# Patient Record
Sex: MALE | Race: WHITE | Employment: FULL TIME | URBAN - METROPOLITAN AREA
[De-identification: names, ages, dates, MRNs, and addresses within clinical notes are randomized per-mention and may not be internally consistent; named-entity substitution may affect disease eponyms.]

---

## 2017-07-19 ENCOUNTER — OFFICE VISIT (OUTPATIENT)
Dept: FAMILY MEDICINE CLINIC | Age: 22
End: 2017-07-19

## 2017-07-19 VITALS
SYSTOLIC BLOOD PRESSURE: 118 MMHG | HEIGHT: 67 IN | OXYGEN SATURATION: 97 % | BODY MASS INDEX: 25.58 KG/M2 | WEIGHT: 163 LBS | DIASTOLIC BLOOD PRESSURE: 70 MMHG | HEART RATE: 128 BPM

## 2017-07-19 DIAGNOSIS — N62 GYNECOMASTIA: ICD-10-CM

## 2017-07-19 DIAGNOSIS — Z00.00 WELL ADULT EXAM: Primary | ICD-10-CM

## 2017-07-19 PROCEDURE — 99385 PREV VISIT NEW AGE 18-39: CPT | Performed by: NURSE PRACTITIONER

## 2017-07-20 ENCOUNTER — INITIAL CONSULT (OUTPATIENT)
Dept: SURGERY | Age: 22
End: 2017-07-20

## 2017-07-20 ENCOUNTER — SURG/PROC ORDERS (OUTPATIENT)
Dept: SURGERY | Age: 22
End: 2017-07-20

## 2017-07-20 VITALS
SYSTOLIC BLOOD PRESSURE: 122 MMHG | BODY MASS INDEX: 25.58 KG/M2 | WEIGHT: 163 LBS | DIASTOLIC BLOOD PRESSURE: 84 MMHG | HEIGHT: 67 IN

## 2017-07-20 DIAGNOSIS — N62 GYNECOMASTIA, MALE: Primary | ICD-10-CM

## 2017-07-20 PROCEDURE — 99243 OFF/OP CNSLTJ NEW/EST LOW 30: CPT | Performed by: SURGERY

## 2017-07-20 RX ORDER — HEPARIN SODIUM 5000 [USP'U]/ML
5000 INJECTION, SOLUTION INTRAVENOUS; SUBCUTANEOUS ONCE
Status: CANCELLED | OUTPATIENT
Start: 2017-07-20

## 2017-07-20 RX ORDER — SODIUM CHLORIDE 0.9 % (FLUSH) 0.9 %
10 SYRINGE (ML) INJECTION PRN
Status: CANCELLED | OUTPATIENT
Start: 2017-07-20

## 2017-07-20 RX ORDER — SODIUM CHLORIDE 0.9 % (FLUSH) 0.9 %
10 SYRINGE (ML) INJECTION EVERY 12 HOURS SCHEDULED
Status: CANCELLED | OUTPATIENT
Start: 2017-07-20

## 2017-07-24 ENCOUNTER — TELEPHONE (OUTPATIENT)
Dept: SURGERY | Age: 22
End: 2017-07-24

## 2017-08-08 ENCOUNTER — HOSPITAL ENCOUNTER (OUTPATIENT)
Dept: SURGERY | Age: 22
Discharge: HOME | End: 2017-08-08
Attending: SURGERY | Admitting: SURGERY

## 2017-08-08 VITALS
RESPIRATION RATE: 16 BRPM | HEART RATE: 79 BPM | OXYGEN SATURATION: 100 % | TEMPERATURE: 98.2 F | BODY MASS INDEX: 25.58 KG/M2 | WEIGHT: 163 LBS | SYSTOLIC BLOOD PRESSURE: 142 MMHG | DIASTOLIC BLOOD PRESSURE: 78 MMHG | HEIGHT: 67 IN

## 2017-08-08 PROCEDURE — 19300 MASTECTOMY FOR GYNECOMASTIA: CPT | Performed by: SURGERY

## 2017-08-08 RX ORDER — HEPARIN SODIUM 5000 [USP'U]/ML
5000 INJECTION, SOLUTION INTRAVENOUS; SUBCUTANEOUS ONCE
Status: COMPLETED | OUTPATIENT
Start: 2017-08-08 | End: 2017-08-08

## 2017-08-08 RX ORDER — MEPERIDINE HYDROCHLORIDE 25 MG/ML
12.5 INJECTION INTRAMUSCULAR; INTRAVENOUS; SUBCUTANEOUS EVERY 5 MIN PRN
Status: DISCONTINUED | OUTPATIENT
Start: 2017-08-08 | End: 2017-08-09 | Stop reason: HOSPADM

## 2017-08-08 RX ORDER — OXYCODONE HYDROCHLORIDE AND ACETAMINOPHEN 5; 325 MG/1; MG/1
1 TABLET ORAL
Status: COMPLETED | OUTPATIENT
Start: 2017-08-08 | End: 2017-08-08

## 2017-08-08 RX ORDER — SODIUM CHLORIDE, SODIUM LACTATE, POTASSIUM CHLORIDE, CALCIUM CHLORIDE 600; 310; 30; 20 MG/100ML; MG/100ML; MG/100ML; MG/100ML
INJECTION, SOLUTION INTRAVENOUS CONTINUOUS
Status: DISCONTINUED | OUTPATIENT
Start: 2017-08-08 | End: 2017-08-09 | Stop reason: HOSPADM

## 2017-08-08 RX ORDER — ONDANSETRON 2 MG/ML
4 INJECTION INTRAMUSCULAR; INTRAVENOUS
Status: COMPLETED | OUTPATIENT
Start: 2017-08-08 | End: 2017-08-08

## 2017-08-08 RX ORDER — LABETALOL HYDROCHLORIDE 5 MG/ML
5 INJECTION, SOLUTION INTRAVENOUS EVERY 10 MIN PRN
Status: DISCONTINUED | OUTPATIENT
Start: 2017-08-08 | End: 2017-08-09 | Stop reason: HOSPADM

## 2017-08-08 RX ORDER — OXYCODONE HYDROCHLORIDE 5 MG/1
5-10 TABLET ORAL EVERY 4 HOURS PRN
Qty: 30 TABLET | Refills: 0 | Status: SHIPPED | OUTPATIENT
Start: 2017-08-08 | End: 2017-08-24 | Stop reason: ALTCHOICE

## 2017-08-08 RX ORDER — SODIUM CHLORIDE 0.9 % (FLUSH) 0.9 %
10 SYRINGE (ML) INJECTION EVERY 12 HOURS SCHEDULED
Status: DISCONTINUED | OUTPATIENT
Start: 2017-08-08 | End: 2017-08-09 | Stop reason: HOSPADM

## 2017-08-08 RX ORDER — HYDROMORPHONE HCL 110MG/55ML
0.5 PATIENT CONTROLLED ANALGESIA SYRINGE INTRAVENOUS EVERY 5 MIN PRN
Status: COMPLETED | OUTPATIENT
Start: 2017-08-08 | End: 2017-08-08

## 2017-08-08 RX ORDER — HYDRALAZINE HYDROCHLORIDE 20 MG/ML
5 INJECTION INTRAMUSCULAR; INTRAVENOUS EVERY 10 MIN PRN
Status: DISCONTINUED | OUTPATIENT
Start: 2017-08-08 | End: 2017-08-09 | Stop reason: HOSPADM

## 2017-08-08 RX ORDER — SODIUM CHLORIDE 0.9 % (FLUSH) 0.9 %
10 SYRINGE (ML) INJECTION PRN
Status: DISCONTINUED | OUTPATIENT
Start: 2017-08-08 | End: 2017-08-09 | Stop reason: HOSPADM

## 2017-08-08 RX ADMIN — Medication 0.5 MG: at 12:57

## 2017-08-08 RX ADMIN — ONDANSETRON 4 MG: 2 INJECTION INTRAMUSCULAR; INTRAVENOUS at 12:52

## 2017-08-08 RX ADMIN — OXYCODONE HYDROCHLORIDE AND ACETAMINOPHEN 1 TABLET: 5; 325 TABLET ORAL at 13:04

## 2017-08-08 RX ADMIN — SODIUM CHLORIDE, SODIUM LACTATE, POTASSIUM CHLORIDE, CALCIUM CHLORIDE: 600; 310; 30; 20 INJECTION, SOLUTION INTRAVENOUS at 10:22

## 2017-08-08 RX ADMIN — Medication 0.5 MG: at 12:52

## 2017-08-08 RX ADMIN — Medication 0.5 MG: at 13:02

## 2017-08-08 RX ADMIN — HEPARIN SODIUM 5000 UNITS: 5000 INJECTION, SOLUTION INTRAVENOUS; SUBCUTANEOUS at 10:23

## 2017-08-08 RX ADMIN — Medication 0.5 MG: at 13:08

## 2017-08-08 ASSESSMENT — PAIN SCALES - GENERAL
PAINLEVEL_OUTOF10: 5
PAINLEVEL_OUTOF10: 4
PAINLEVEL_OUTOF10: 6
PAINLEVEL_OUTOF10: 5
PAINLEVEL_OUTOF10: 4
PAINLEVEL_OUTOF10: 5
PAINLEVEL_OUTOF10: 4

## 2017-08-08 ASSESSMENT — PAIN - FUNCTIONAL ASSESSMENT: PAIN_FUNCTIONAL_ASSESSMENT: 0-10

## 2017-08-24 ENCOUNTER — OFFICE VISIT (OUTPATIENT)
Dept: SURGERY | Age: 22
End: 2017-08-24

## 2017-08-24 VITALS
BODY MASS INDEX: 25.11 KG/M2 | WEIGHT: 160 LBS | HEIGHT: 67 IN | DIASTOLIC BLOOD PRESSURE: 70 MMHG | SYSTOLIC BLOOD PRESSURE: 122 MMHG

## 2017-08-24 DIAGNOSIS — Z98.890 POST-OPERATIVE STATE: Primary | ICD-10-CM

## 2017-08-24 PROCEDURE — 99024 POSTOP FOLLOW-UP VISIT: CPT | Performed by: SURGERY

## 2022-01-31 ENCOUNTER — APPOINTMENT (OUTPATIENT)
Dept: CT IMAGING | Age: 27
End: 2022-01-31
Payer: COMMERCIAL

## 2022-01-31 ENCOUNTER — APPOINTMENT (OUTPATIENT)
Dept: GENERAL RADIOLOGY | Age: 27
End: 2022-01-31
Payer: COMMERCIAL

## 2022-01-31 ENCOUNTER — HOSPITAL ENCOUNTER (EMERGENCY)
Age: 27
Discharge: HOME | End: 2022-01-31
Payer: COMMERCIAL

## 2022-01-31 VITALS
HEIGHT: 67 IN | SYSTOLIC BLOOD PRESSURE: 146 MMHG | WEIGHT: 215 LBS | RESPIRATION RATE: 16 BRPM | HEART RATE: 96 BPM | OXYGEN SATURATION: 99 % | DIASTOLIC BLOOD PRESSURE: 96 MMHG | TEMPERATURE: 97.3 F | BODY MASS INDEX: 33.74 KG/M2

## 2022-01-31 DIAGNOSIS — E87.6 HYPOKALEMIA: ICD-10-CM

## 2022-01-31 DIAGNOSIS — R11.2 NAUSEA AND VOMITING, INTRACTABILITY OF VOMITING NOT SPECIFIED, UNSPECIFIED VOMITING TYPE: ICD-10-CM

## 2022-01-31 DIAGNOSIS — B34.9 VIRAL ILLNESS: ICD-10-CM

## 2022-01-31 DIAGNOSIS — R53.83 OTHER FATIGUE: ICD-10-CM

## 2022-01-31 DIAGNOSIS — R07.9 CHEST PAIN, UNSPECIFIED TYPE: Primary | ICD-10-CM

## 2022-01-31 DIAGNOSIS — E83.42 HYPOMAGNESEMIA: ICD-10-CM

## 2022-01-31 LAB
A/G RATIO: 1.7 (ref 1.1–2.2)
ALBUMIN SERPL-MCNC: 5.2 G/DL (ref 3.4–5)
ALP BLD-CCNC: 85 U/L (ref 40–129)
ALT SERPL-CCNC: 71 U/L (ref 10–40)
ANION GAP SERPL CALCULATED.3IONS-SCNC: 15 MMOL/L (ref 3–16)
AST SERPL-CCNC: 98 U/L (ref 15–37)
BASOPHILS ABSOLUTE: 0.1 K/UL (ref 0–0.2)
BASOPHILS RELATIVE PERCENT: 1 %
BILIRUB SERPL-MCNC: 3.2 MG/DL (ref 0–1)
BUN BLDV-MCNC: 8 MG/DL (ref 7–20)
CALCIUM SERPL-MCNC: 9.5 MG/DL (ref 8.3–10.6)
CHLORIDE BLD-SCNC: 100 MMOL/L (ref 99–110)
CO2: 25 MMOL/L (ref 21–32)
CREAT SERPL-MCNC: 0.9 MG/DL (ref 0.9–1.3)
D DIMER: 261 NG/ML DDU (ref 0–229)
EKG ATRIAL RATE: 142 BPM
EKG DIAGNOSIS: NORMAL
EKG P AXIS: 70 DEGREES
EKG P-R INTERVAL: 134 MS
EKG Q-T INTERVAL: 270 MS
EKG QRS DURATION: 78 MS
EKG QTC CALCULATION (BAZETT): 415 MS
EKG R AXIS: 73 DEGREES
EKG T AXIS: 35 DEGREES
EKG VENTRICULAR RATE: 142 BPM
EOSINOPHILS ABSOLUTE: 0 K/UL (ref 0–0.6)
EOSINOPHILS RELATIVE PERCENT: 0.1 %
GFR AFRICAN AMERICAN: >60
GFR NON-AFRICAN AMERICAN: >60
GLUCOSE BLD-MCNC: 136 MG/DL (ref 70–99)
HCT VFR BLD CALC: 46.8 % (ref 40.5–52.5)
HEMOGLOBIN: 16.5 G/DL (ref 13.5–17.5)
INFLUENZA A: NOT DETECTED
INFLUENZA B: NOT DETECTED
LYMPHOCYTES ABSOLUTE: 1.3 K/UL (ref 1–5.1)
LYMPHOCYTES RELATIVE PERCENT: 19.3 %
MAGNESIUM: 1.4 MG/DL (ref 1.8–2.4)
MCH RBC QN AUTO: 32.6 PG (ref 26–34)
MCHC RBC AUTO-ENTMCNC: 35.3 G/DL (ref 31–36)
MCV RBC AUTO: 92.3 FL (ref 80–100)
MONOCYTES ABSOLUTE: 0.5 K/UL (ref 0–1.3)
MONOCYTES RELATIVE PERCENT: 7.5 %
NEUTROPHILS ABSOLUTE: 5 K/UL (ref 1.7–7.7)
NEUTROPHILS RELATIVE PERCENT: 72.1 %
PDW BLD-RTO: 13.3 % (ref 12.4–15.4)
PLATELET # BLD: 143 K/UL (ref 135–450)
PMV BLD AUTO: 6.3 FL (ref 5–10.5)
POTASSIUM REFLEX MAGNESIUM: 3.4 MMOL/L (ref 3.5–5.1)
RBC # BLD: 5.07 M/UL (ref 4.2–5.9)
SARS-COV-2 RNA, RT PCR: NOT DETECTED
SODIUM BLD-SCNC: 140 MMOL/L (ref 136–145)
TOTAL PROTEIN: 8.3 G/DL (ref 6.4–8.2)
TROPONIN: <0.01 NG/ML
TROPONIN: <0.01 NG/ML
WBC # BLD: 6.9 K/UL (ref 4–11)

## 2022-01-31 PROCEDURE — 83735 ASSAY OF MAGNESIUM: CPT

## 2022-01-31 PROCEDURE — 96366 THER/PROPH/DIAG IV INF ADDON: CPT

## 2022-01-31 PROCEDURE — 93005 ELECTROCARDIOGRAM TRACING: CPT | Performed by: STUDENT IN AN ORGANIZED HEALTH CARE EDUCATION/TRAINING PROGRAM

## 2022-01-31 PROCEDURE — 71260 CT THORAX DX C+: CPT

## 2022-01-31 PROCEDURE — 6360000004 HC RX CONTRAST MEDICATION: Performed by: PHYSICIAN ASSISTANT

## 2022-01-31 PROCEDURE — 71045 X-RAY EXAM CHEST 1 VIEW: CPT

## 2022-01-31 PROCEDURE — 6360000002 HC RX W HCPCS: Performed by: PHYSICIAN ASSISTANT

## 2022-01-31 PROCEDURE — U0003 INFECTIOUS AGENT DETECTION BY NUCLEIC ACID (DNA OR RNA); SEVERE ACUTE RESPIRATORY SYNDROME CORONAVIRUS 2 (SARS-COV-2) (CORONAVIRUS DISEASE [COVID-19]), AMPLIFIED PROBE TECHNIQUE, MAKING USE OF HIGH THROUGHPUT TECHNOLOGIES AS DESCRIBED BY CMS-2020-01-R: HCPCS

## 2022-01-31 PROCEDURE — 80053 COMPREHEN METABOLIC PANEL: CPT

## 2022-01-31 PROCEDURE — 93010 ELECTROCARDIOGRAM REPORT: CPT | Performed by: INTERNAL MEDICINE

## 2022-01-31 PROCEDURE — 96375 TX/PRO/DX INJ NEW DRUG ADDON: CPT

## 2022-01-31 PROCEDURE — 6370000000 HC RX 637 (ALT 250 FOR IP): Performed by: PHYSICIAN ASSISTANT

## 2022-01-31 PROCEDURE — 2500000003 HC RX 250 WO HCPCS: Performed by: PHYSICIAN ASSISTANT

## 2022-01-31 PROCEDURE — 99284 EMERGENCY DEPT VISIT MOD MDM: CPT

## 2022-01-31 PROCEDURE — U0005 INFEC AGEN DETEC AMPLI PROBE: HCPCS

## 2022-01-31 PROCEDURE — 96365 THER/PROPH/DIAG IV INF INIT: CPT

## 2022-01-31 PROCEDURE — 87636 SARSCOV2 & INF A&B AMP PRB: CPT

## 2022-01-31 PROCEDURE — 84484 ASSAY OF TROPONIN QUANT: CPT

## 2022-01-31 PROCEDURE — 85379 FIBRIN DEGRADATION QUANT: CPT

## 2022-01-31 PROCEDURE — 85025 COMPLETE CBC W/AUTO DIFF WBC: CPT

## 2022-01-31 RX ORDER — 0.9 % SODIUM CHLORIDE 0.9 %
1000 INTRAVENOUS SOLUTION INTRAVENOUS ONCE
Status: DISCONTINUED | OUTPATIENT
Start: 2022-01-31 | End: 2022-01-31 | Stop reason: HOSPADM

## 2022-01-31 RX ORDER — MAGNESIUM SULFATE IN WATER 40 MG/ML
4000 INJECTION, SOLUTION INTRAVENOUS ONCE
Status: COMPLETED | OUTPATIENT
Start: 2022-01-31 | End: 2022-01-31

## 2022-01-31 RX ORDER — MULTIVIT-MIN/IRON FUM/FOLIC AC 7.5 MG-4
1 TABLET ORAL DAILY
Qty: 30 TABLET | Refills: 3 | Status: SHIPPED | OUTPATIENT
Start: 2022-01-31 | End: 2022-06-24

## 2022-01-31 RX ORDER — KETOROLAC TROMETHAMINE 30 MG/ML
15 INJECTION, SOLUTION INTRAMUSCULAR; INTRAVENOUS ONCE
Status: COMPLETED | OUTPATIENT
Start: 2022-01-31 | End: 2022-01-31

## 2022-01-31 RX ORDER — POTASSIUM CHLORIDE 20 MEQ/1
40 TABLET, EXTENDED RELEASE ORAL ONCE
Status: COMPLETED | OUTPATIENT
Start: 2022-01-31 | End: 2022-01-31

## 2022-01-31 RX ORDER — ONDANSETRON 2 MG/ML
4 INJECTION INTRAMUSCULAR; INTRAVENOUS ONCE
Status: COMPLETED | OUTPATIENT
Start: 2022-01-31 | End: 2022-01-31

## 2022-01-31 RX ADMIN — IOPAMIDOL 85 ML: 755 INJECTION, SOLUTION INTRAVENOUS at 14:34

## 2022-01-31 RX ADMIN — KETOROLAC TROMETHAMINE 15 MG: 30 INJECTION, SOLUTION INTRAMUSCULAR at 13:42

## 2022-01-31 RX ADMIN — MAGNESIUM SULFATE HEPTAHYDRATE 4000 MG: 40 INJECTION, SOLUTION INTRAVENOUS at 17:28

## 2022-01-31 RX ADMIN — ONDANSETRON HYDROCHLORIDE 4 MG: 2 INJECTION, SOLUTION INTRAMUSCULAR; INTRAVENOUS at 13:43

## 2022-01-31 RX ADMIN — FAMOTIDINE 20 MG: 10 INJECTION, SOLUTION INTRAVENOUS at 13:42

## 2022-01-31 RX ADMIN — Medication 1000 ML: at 13:39

## 2022-01-31 RX ADMIN — POTASSIUM CHLORIDE 40 MEQ: 1500 TABLET, EXTENDED RELEASE ORAL at 17:23

## 2022-01-31 ASSESSMENT — ENCOUNTER SYMPTOMS
COUGH: 0
SORE THROAT: 0
VOMITING: 0
EYE DISCHARGE: 0
DIARRHEA: 0
EYE REDNESS: 0
RHINORRHEA: 0
NAUSEA: 0
CHEST TIGHTNESS: 0
SINUS PAIN: 0
SHORTNESS OF BREATH: 0
SINUS PRESSURE: 0
ABDOMINAL PAIN: 0
CONSTIPATION: 0

## 2022-01-31 ASSESSMENT — HEART SCORE: ECG: 0

## 2022-01-31 ASSESSMENT — PAIN SCALES - GENERAL: PAINLEVEL_OUTOF10: 5

## 2022-01-31 NOTE — ED PROVIDER NOTES
[address] ED  EMERGENCY DEPARTMENT ENCOUNTER        Pt Name: Aline Hairston  MRN: [id number]  [place] 1995  Date of evaluation: 1/31/2022  Provider: KATE Araiza  PCP: Jemima Florentino, APRN - CNP    This patient was not seen and evaluated by the attending physician No att. providers found. I have evaluated this patient. My supervising physician was available for consultation. I assisted in the care of this patient, discussed final lab and imaging results with the patient. CHIEF COMPLAINT       Chief Complaint   Patient presents with    Illness     pt c/o fatigue, sore, chest pain, cough, post tussive emesis.  Chest Pain       HISTORY OF PRESENT ILLNESS   (Location/Symptom, Timing/Onset, Context/Setting, Quality, Duration, Modifying Factors, Severity)  Note limiting factors. Aline Hairston is a 28 y.o. male. Presently denies any acute pain. Notified patient that we are waiting on repeat troponin and notify the patient that his potassium and magnesium are low and that he will be getting replenishment while he is waiting in the ER. Nursing Notes were all reviewed and agreed with or any disagreements were addressed  in the HPI. Pt was seen during the [place] 19 pandemic. Appropriate PPE worn by ME during patient encounters. Pt seen during a time with constrained hospital bed capacity and other potential inpatient and outpatient resources were constrained due to the viral pandemic. REVIEW OF SYSTEMS    (2-9 systems for level 4, 10 or more for level 5)     Review of Systems    Positives and Pertinent negatives as per HPI. Except as noted abovein the ROS, all other systems were reviewed and negative.        PAST MEDICAL HISTORY     Past Medical History:   Diagnosis Date    Gynecomastia, male          SURGICAL HISTORY     Past Surgical History:   Procedure Laterality Date    OTHER SURGICAL HISTORY  march 10, 2016    gynecomastia removal bilat breasts    OTHER SURGICAL HISTORY Bilateral 08/08/2017    simple mastectomy         CURRENTMEDICATIONS       Previous Medications    No medications on file         ALLERGIES     Patient has no known allergies. FAMILYHISTORY     History reviewed. No pertinent family history. SOCIAL HISTORY       Social History     Socioeconomic History    Marital status:      Spouse name: None    Number of children: None    Years of education: None    Highest education level: None   Occupational History    None   Tobacco Use    Smoking status: Never Smoker    Smokeless tobacco: None   Substance and Sexual Activity    Alcohol use: Yes     Comment: couple times a week    Drug use: No    Sexual activity: Yes   Other Topics Concern    None   Social History Narrative    None     Social Determinants of Health     Financial Resource Strain:     Difficulty of Paying Living Expenses: Not on file   Food Insecurity:     Worried About Running Out of Food in the Last Year: Not on file    Jake of Food in the Last Year: Not on file   Transportation Needs:     Lack of Transportation (Medical): Not on file    Lack of Transportation (Non-Medical):  Not on file   Physical Activity:     Days of Exercise per Week: Not on file    Minutes of Exercise per Session: Not on file   Stress:     Feeling of Stress : Not on file   Social Connections:     Frequency of Communication with Friends and Family: Not on file    Frequency of Social Gatherings with Friends and Family: Not on file    Attends [employer] Services: Not on file    Active Member of Clubs or Organizations: Not on file    Attends Club or Organization Meetings: Not on file    Marital Status: Not on file   Intimate Partner Violence:     Fear of Current or Ex-Partner: Not on file    Emotionally Abused: Not on file    Physically Abused: Not on file    Sexually Abused: Not on file   Housing Stability:     Unable to Pay for Housing in the Last Year: Not on file    Number of Places Lived in the Last Year: Not on file    Unstable Housing in the Last Year: Not on file       SCREENINGS      Heart Score for chest pain patients  History: Slightly Suspicious  ECG: Normal  Patient Age: < 45 years  Risk Factors: No risk factors known  Troponin: < 1X normal limit  Heart Score Total: 0      PHYSICAL EXAM    (up to 7 for level 4, 8 or more for level 5)     ED Triage Vitals [01/31/22 1143]   BP Temp Temp Source Pulse Resp SpO2 Height Weight   (!) 189/115 97.3 °F (36.3 °C) Oral 145 18 98 % 5' 7\" (1.702 m) 215 lb (97.5 kg)       Physical Exam  PHYSICAL EXAM  BP (!) 147/102   Pulse 92   Temp 97.3 °F (36.3 °C) (Oral)   Resp 18   Ht 5' 7\" (1.702 m)   Wt 215 lb (97.5 kg)   SpO2 99%   BMI 33.67 kg/m²   GENERAL APPEARANCE: Awake and alert. Cooperative. Well-appearing adult male sitting upright in exam bed nondiaphoretic breathing comfortably on room air showing no sign of acute respiratory distress. HEAD: Normocephalic. Atraumatic. EYES: PERRL. EOM's grossly intact. ENT: Mucous membranes are moist.. NECK: Supple. HEART: RRR. No murmurs. Radial pulses 2+ symmetric. LUNGS: Respirations unlabored. CTAB. Good air exchange. Speaking comfortably in full sentences. EXTREMITIES: No peripheral edema. Moves all extremities equally. All extremities neurovascularly intact. SKIN: Warm and dry. NEUROLOGICAL: Alert and oriented. CN grossly intact. No gross facial drooping. Power intact to UE and LE, sensation intact x 4. No tremors or ataxia. Gait intact. PSYCHIATRIC: Normal mood and affect.      DIAGNOSTIC RESULTS   LABS:    Labs Reviewed   COMPREHENSIVE METABOLIC PANEL W/ REFLEX TO MG FOR LOW K - Abnormal; Notable for the following components:       Result Value    Potassium reflex Magnesium 3.4 (*)     Glucose 136 (*)     Total Protein 8.3 (*)     Albumin 5.2 (*)     Total Bilirubin 3.2 (*)     ALT 71 (*)     AST 98 (*)     All other components within normal limits Narrative:     Performed at:  [facility]) - [facility] [address],  [place]   Phone [phone]   MAGNESIUM - Abnormal; Notable for the following components:    Magnesium 1.40 (*)     All other components within normal limits    Narrative:     Performed at:  [facility] [address],  [place] [facility] [place] [facility]   Phone [phone]   D-DIMER, QUANTITATIVE - Abnormal; Notable for the following components:    D-Dimer, Quant 261 (*)     All other components within normal limits    Narrative:     Performed at:  [facility] [address],  [place] [place]   Phone [phone]    Narrative:     Performed at:  [address] [place] [address],  [place], [facility]   Phone [phone]   CBC WITH AUTO DIFFERENTIAL    Narrative:     Performed at:  [address] [address],  [place], [facility]   Phone [phone]   TROPONIN    Narrative:     Performed at:  [address] [address],  [place], [facility]   Phone [phone]   TROPONIN    Narrative:     Performed at:  [facility]) - [facility] [address],  [place] [place]   Phone [phone]       All other labs were within normal range or not returned as of this dictation. EKG: All EKG's are interpreted by the Emergency Department Physician who either signs orCo-signs this chart in the absence of a cardiologist.  Please see their note for interpretation of EKG.       RADIOLOGY:   Non-plain film images such as CT, Ultrasound and MRI are read by the radiologist. Plain radiographic images are visualized andpreliminarily interpreted by the  ED Provider with the below findings:        Interpretation perthe Radiologist below, if available at the time of this note:    CT CHEST PULMONARY EMBOLISM W CONTRAST   Final Result   Markedly limited exam as above. No large central pulmonary emboli are   identified. RECOMMENDATIONS:   Unavailable         XR CHEST PORTABLE   Final Result   No acute abnormality           XR CHEST PORTABLE    Result Date: 1/31/2022  EXAMINATION: ONE XRAY VIEW OF THE CHEST 1/31/2022 1:53 pm COMPARISON: None. HISTORY: ORDERING SYSTEM PROVIDED HISTORY: chest pain TECHNOLOGIST PROVIDED HISTORY: Reason for exam:->chest pain Reason for Exam: chest pain, shortness of breath and cough for approx. a week and a half FINDINGS: The heart and pulmonary vascularity are within normal limits. There are no focal areas of consolidation or pleural effusion. The osseous structures are intact. There is mild nonspecific elevation of the right hemidiaphragm. No acute abnormality     CT CHEST PULMONARY EMBOLISM W CONTRAST    Result Date: 1/31/2022  EXAMINATION: CTA OF THE CHEST 1/31/2022 2:31 pm TECHNIQUE: CTA of the chest was performed after the administration of intravenous contrast.  Multiplanar reformatted images are provided for review. MIP images are provided for review. Dose modulation, iterative reconstruction, and/or weight based adjustment of the mA/kV was utilized to reduce the radiation dose to as low as reasonably achievable. COMPARISON: None. HISTORY: ORDERING SYSTEM PROVIDED HISTORY: r/o PE TECHNOLOGIST PROVIDED HISTORY: USE THIS ONE TO R/O PE Reason for exam:->r/o PE Decision Support Exception - unselect if not a suspected or confirmed emergency medical condition->Emergency Medical Condition (MA) Reason for Exam: chest pain x 2 days sob FINDINGS: Pulmonary Arteries: Pulmonary arteries are poorly opacified. The exam is significantly limited by motion artifact. No large central pulmonary emboli are identified. Mediastinum: No evidence of mediastinal lymphadenopathy. The heart and pericardium demonstrate no acute abnormality.   There is no acute abnormality of the thoracic aorta. Lungs/pleura: The lungs are without acute process. No focal consolidation or pulmonary edema. No evidence of pleural effusion or pneumothorax. Upper Abdomen: Limited images of the upper abdomen are unremarkable. Soft Tissues/Bones: No acute bone or soft tissue abnormality. Markedly limited exam as above. No large central pulmonary emboli are identified. RECOMMENDATIONS: Unavailable          PROCEDURES   Unless otherwise noted below, none     Procedures    CRITICAL CARE TIME   N/A    CONSULTS:  None      EMERGENCY DEPARTMENT COURSE and DIFFERENTIALDIAGNOSIS/MDM:   Vitals:    Vitals:    01/31/22 1143 01/31/22 1504   BP: (!) 189/115 (!) 147/102   Pulse: 145 92   Resp: 18 18   Temp: 97.3 °F (36.3 °C)    TempSrc: Oral    SpO2: 98% 99%   Weight: 215 lb (97.5 kg)    Height: 5' 7\" (1.702 m)        Patient was given thefollowing medications:  Medications   0.9 % sodium chloride bolus (0 mLs IntraVENous Stopped 1/31/22 1504)   potassium chloride (KLOR-CON M) extended release tablet 40 mEq (has no administration in time range)   magnesium sulfate 4000 mg in 100 mL IVPB premix (has no administration in time range)   ondansetron (ZOFRAN) injection 4 mg (4 mg IntraVENous Given 1/31/22 1343)   famotidine (PEPCID) injection 20 mg (20 mg IntraVENous Given 1/31/22 1342)   ketorolac (TORADOL) injection 15 mg (15 mg IntraVENous Given 1/31/22 1342)   iopamidol (ISOVUE-370) 76 % injection 85 mL (85 mLs IntraVENous Given 1/31/22 1434)       PDMP Monitoring:    Last PDMP [place] as Reviewed [facility]):  Review User Review Instant Review Result            Urine Drug Screenings (1 yr)    No resulted procedures found. Medication Contract and Consent for Opioid Use Documents Filed      No documents found                MDM:   Patient seen and evaluated. Old records reviewed. Diagnostic testing reviewed and results discussed. I have independently evaluated this patient based upon my scope of practice.  Supervising physician was in the department for consultation as needed. 30-year-old male, evaluated for chest pain. Signed out to me by Ben Stuart please see her note for full details. Repeat troponin negative. Potassium and magnesium given to patient while in the department. At this time I believe he is reasonable candidate for discharge home with close follow-up with PCP and strict ER return precautions. Discharge Time out:  CC Reviewed Yes   Test Results Yes     Vitals:    01/31/22 1504   BP: (!) 147/102   Pulse: 92   Resp: 18   Temp:    SpO2: 99%              FINAL IMPRESSION      1. Chest pain, unspecified type    2. Other fatigue    3. Nausea and vomiting, intractability of vomiting not specified, unspecified vomiting type    4.  Viral illness          DISPOSITION/PLAN   DISPOSITION        PATIENT REFERREDTO:  JANINE Luque - CNP  [address] [phone]      for a recheck in 2-3  days      DISCHARGE MEDICATIONS:  New Prescriptions    No medications on file       DISCONTINUED MEDICATIONS:  Discontinued Medications    No medications on file              (Please note that portions ofthis note were completed with a voice recognition program.  Efforts were made to edit the dictations but occasionally words are mis-transcribed.)    Harish Estrada (electronically signed)       Harish Estrada  02/10/22 1796

## 2022-01-31 NOTE — ED PROVIDER NOTES
[address] ED  EMERGENCY DEPARTMENT ENCOUNTER        Pt Name: Purvi Floyd  MRN: [id number]  [place] 1995  Date of evaluation: 1/31/2022  Provider: Idalia Stout PA-C  PCP: No primary care provider on file. This patient was not seen and evaluated by the attending physician   I have independently evaluated this patient. CHIEF COMPLAINT       Chief Complaint   Patient presents with    Illness     pt c/o fatigue, sore, chest pain, cough, post tussive emesis.  Chest Pain       HISTORY OF PRESENT ILLNESS   (Location/Symptom, Timing/Onset, Context/Setting, Quality, Duration, Modifying Factors, Severity)  Note limiting factors. Purvi Floyd is a 28 y.o. male for valuation of a 2-week history of feeling fatigued sore throat coughing which is productive. A 2-day history of midsternal chest pain denies any shortness of breath denies any radiation. Patient denies any history of PE or DVT. No recent travel or immobilization. Patient took a Covid test which was negative, 7 days ago. Wife with positive covid test.   Strong family history of cardiac disease   Mothers family, h/o CAD   He has a h/o HTN, no medications. No tobacco no DM    Nursing Notes were all reviewed and agreed with or any disagreements were addressed  in the HPI. REVIEW OF SYSTEMS  (2-9 systems for level 4, 10 or more for level 5)     Review of Systems   Constitutional: Negative for chills and fever. HENT: Negative. Negative for congestion, rhinorrhea, sinus pressure, sinus pain and sore throat. Eyes: Negative for discharge, redness and visual disturbance. Respiratory: Negative for cough, chest tightness and shortness of breath. Cardiovascular: Positive for chest pain. Negative for palpitations. Gastrointestinal: Negative for abdominal pain, constipation, diarrhea, nausea and vomiting. Genitourinary: Negative for difficulty urinating, dysuria and frequency.    Musculoskeletal: Negative. Skin: Negative. Neurological: Negative. Negative for dizziness, weakness, numbness and headaches. Psychiatric/Behavioral: Negative. All other systems reviewed and are negative. Positivesand Pertinent negatives as per HPI. Except as noted above in the ROS, all other systems were reviewed and negative. PAST MEDICAL HISTORY     Past Medical History:   Diagnosis Date    Gynecomastia, male          SURGICAL HISTORY       Past Surgical History:   Procedure Laterality Date    OTHER SURGICAL HISTORY  march 10, 2016    gynecomastia removal bilat breasts    OTHER SURGICAL HISTORY Bilateral 08/08/2017    simple mastectomy         CURRENT MEDICATIONS       Discharge Medication List as of 1/31/2022  5:53 PM            ALLERGIES     Patient has no known allergies. FAMILY HISTORY     History reviewed. No pertinent family history. SOCIAL HISTORY       Social History     Socioeconomic History    Marital status:      Spouse name: None    Number of children: None    Years of education: None    Highest education level: None   Occupational History    None   Tobacco Use    Smoking status: Never Smoker    Smokeless tobacco: None   Substance and Sexual Activity    Alcohol use: Yes     Comment: couple times a week    Drug use: No    Sexual activity: Yes   Other Topics Concern    None   Social History Narrative    None     Social Determinants of Health     Financial Resource Strain:     Difficulty of Paying Living Expenses: Not on file   Food Insecurity:     Worried About Running Out of Food in the Last Year: Not on file    Jake of Food in the Last Year: Not on file   Transportation Needs:     Lack of Transportation (Medical): Not on file    Lack of Transportation (Non-Medical):  Not on file   Physical Activity:     Days of Exercise per Week: Not on file    Minutes of Exercise per Session: Not on file   Stress:     Feeling of Stress : Not on file   Social Connections:  Frequency of Communication with Friends and Family: Not on file    Frequency of Social Gatherings with Friends and Family: Not on file    Attends Quaker Services: Not on file    Active Member of Clubs or Organizations: Not on file    Attends Club or Organization Meetings: Not on file    Marital Status: Not on file   Intimate Partner Violence:     Fear of Current or Ex-Partner: Not on file    Emotionally Abused: Not on file    Physically Abused: Not on file    Sexually Abused: Not on file   Housing Stability:     Unable to Pay for Housing in the Last Year: Not on file    Number of [place] in the Last Year: Not on file    Unstable Housing in the Last Year: Not on file       SCREENINGS     NIH Score       [facility]     Heart Score  Heart Score for chest pain patients  History: Slightly Suspicious  ECG: Normal  Patient Age: < 45 years  Risk Factors: No risk factors known  Troponin: < 1X normal limit  Heart Score Total: 0      PHYSICAL EXAM    (up to 7 for level 4, 8 ormore for level 5)     ED Triage Vitals [01/31/22 1143]   BP Temp Temp Source Pulse Resp SpO2 Height Weight   (!) 189/115 97.3 °F (36.3 °C) Oral 145 18 98 % 5' 7\" (1.702 m) 215 lb (97.5 kg)       Physical Exam  Vitals and nursing note reviewed. Constitutional:       Appearance: He is well-developed. He is not diaphoretic. HENT:      Head: Normocephalic. Nose: Nose normal.      Mouth/Throat:      Pharynx: No oropharyngeal exudate. Eyes:      General:         Right eye: No discharge. Left eye: No discharge. Conjunctiva/sclera: Conjunctivae normal.      Pupils: Pupils are equal, round, and reactive to light. Cardiovascular:      Rate and Rhythm: Regular rhythm. Tachycardia present. Heart sounds: Normal heart sounds. No murmur heard. No friction rub. No gallop. Pulmonary:      Effort: Pulmonary effort is normal. No respiratory distress. Breath sounds: Normal breath sounds.  No wheezing. Abdominal:      General: Bowel sounds are normal. There is no distension. Palpations: Abdomen is soft. Tenderness: There is no abdominal tenderness. Musculoskeletal:         General: Normal range of motion. Cervical back: Normal range of motion. Skin:     General: Skin is warm and dry. Capillary Refill: Capillary refill takes less than 2 seconds. Neurological:      General: No focal deficit present. Mental Status: He is alert and oriented to person, place, and time.    Psychiatric:         Behavior: Behavior normal.         DIAGNOSTIC RESULTS   LABS:    Labs Reviewed   COMPREHENSIVE METABOLIC PANEL W/ REFLEX TO MG FOR LOW K - Abnormal; Notable for the following components:       Result Value    Potassium reflex Magnesium 3.4 (*)     Glucose 136 (*)     Total Protein 8.3 (*)     Albumin 5.2 (*)     Total Bilirubin 3.2 (*)     ALT 71 (*)     AST 98 (*)     All other components within normal limits    Narrative:     Performed at:  [facility]) - [place] [address],  [employer], [facility] [employer]   Phone [phone]   MAGNESIUM - Abnormal; Notable for the following components:    Magnesium 1.40 (*)     All other components within normal limits    Narrative:     Performed at:  [facility]) - [facility] [address],  [place], [facility] [employer]   Phone [phone]   D-DIMER, QUANTITATIVE - Abnormal; Notable for the following components:    D-Dimer, Quant 261 (*)     All other components within normal limits    Narrative:     Performed at:  [facility],  [place], [facility]   Phone [phone] & INFLUENZA COMBO    Narrative:     Performed at:  [place] [address],  [place], [facility]   Phone [phone]   CBC WITH AUTO DIFFERENTIAL    Narrative:     Performed at:  [facility]) - [facility], [place], [facility]   Phone [phone]   TROPONIN    Narrative:     Performed at:  [facility]) - [facility],  [place], [facility]   Phone [phone]   TROPONIN    Narrative:     Performed at:  [facility],  [place], [facility]   Phone [phone]    Narrative:     Performed at:  [facility]  [address] [facility] [address]   Phone [phone]       All other labs were within normal range or notreturned as of this dictation. EKG: All EKG's are interpreted by the Emergency Department Physician who either signs or Co-signs this chart in the absence of a cardiologist.  Please see their note for interpretation of EKG. RADIOLOGY:     Interpretation per the Radiologist below, if available at the time of this note:    CT CHEST PULMONARY EMBOLISM W CONTRAST   Final Result   Markedly limited exam as above. No large central pulmonary emboli are   identified.       RECOMMENDATIONS:   Unavailable         XR CHEST PORTABLE   Final Result   No acute abnormality                 CONSULTS:  None      EMERGENCY DEPARTMENT COURSE and DIFFERENTIAL DIAGNOSIS/MDM:   Vitals:    Vitals:    01/31/22 2000 01/31/22 2030 01/31/22 2102 01/31/22 2140   BP: 134/89 139/89 137/86 (!) 146/96   Pulse: 103 82 94 96   Resp: 18 16 16 16   Temp:       TempSrc:       SpO2: 98% 98% 97% 99%   Weight:       Height:           Patient was given the following medications:  Medications   ondansetron (ZOFRAN) injection 4 mg (4 mg IntraVENous Given 1/31/22 1343)   famotidine (PEPCID) injection 20 mg (20 mg IntraVENous Given 1/31/22 1342)   ketorolac (TORADOL) injection 15 mg (15 mg IntraVENous Given 1/31/22 1342)   iopamidol (ISOVUE-370) 76 % injection 85 mL (85 mLs IntraVENous Given 1/31/22 1434)   potassium chloride (KLOR-CON M) extended release tablet 40 mEq (40 mEq Oral Given 1/31/22 1723)   magnesium sulfate 4000 mg in 100 mL IVPB premix (0 mg IntraVENous Stopped 1/31/22 6243)         Afebrile, stable, patient presents to the ED for evaluation. Patients SPO2 is 98% on room air they are not hypoxic. ED Course as of 02/02/22 1105   Mon Jan 31, 2022   1628 Re-evaluated patient, discussed results, additional troponin being drawn while I am in the room  [AR]      ED Course User Index  [AR] [address] evaluated reveal no elevation in troponin, negative covid, influenza, elevated d-dimer, therefore CT of chest is evaluated. EKG shows sinus tachycardia. Pt provided with iv fluids, pepcid, toradol which help his symptoms on re-evaluation. 3 hour troponin is evaluated. CXR shows no acute findings. CT chest shows no PE, PNA. Pt is signed out to HEAHT Gaston awaiting troponin. All questions are answered. Indications for return to the ED are discussed. Patient is advised if any new or worsening symptoms arise they should immediately return to the emergency room. Follow-up with primary care in 1-2 days. The patient tolerated their visit well. The patient and / or the family were informed of the results of any tests, a time was given to answer questions, a plan was proposed and they agreed Elsi Calvo.     Results for orders placed or performed during the hospital encounter of 01/31/22   COVID-19 & Influenza Combo    Specimen: Nasopharyngeal Swab   Result Value Ref Range    SARS-CoV-2 RNA, RT PCR NOT DETECTED NOT DETECTED    INFLUENZA A NOT DETECTED NOT DETECTED    INFLUENZA B NOT DETECTED NOT DETECTED   CBC auto differential   Result Value Ref Range    WBC 6.9 4.0 - 11.0 K/uL    RBC 5.07 4.20 - 5.90 M/uL    Hemoglobin 16.5 13.5 - 17.5 g/dL    Hematocrit 46.8 40.5 - 52.5 %    MCV 92.3 80.0 - 100.0 fL    MCH 32.6 26.0 - 34.0 pg    MCHC 35.3 31.0 - 36.0 g/dL    RDW 13.3 12.4 - 15.4 %    Platelets 321 362 - 970 K/uL    MPV 6.3 5.0 - 10.5 fL    Neutrophils % 72.1 %    Lymphocytes % 19.3 % Monocytes % 7.5 %    Eosinophils % 0.1 %    Basophils % 1.0 %    Neutrophils Absolute 5.0 1.7 - 7.7 K/uL    Lymphocytes Absolute 1.3 1.0 - 5.1 K/uL    Monocytes Absolute 0.5 0.0 - 1.3 K/uL    Eosinophils Absolute 0.0 0.0 - 0.6 K/uL    Basophils Absolute 0.1 0.0 - 0.2 K/uL   Comprehensive Metabolic Panel w/ Reflex to MG   Result Value Ref Range    Sodium 140 136 - 145 mmol/L    Potassium reflex Magnesium 3.4 (L) 3.5 - 5.1 mmol/L    Chloride 100 99 - 110 mmol/L    CO2 25 21 - 32 mmol/L    Anion Gap 15 3 - 16    Glucose 136 (H) 70 - 99 mg/dL    BUN 8 7 - 20 mg/dL    CREATININE 0.9 0.9 - 1.3 mg/dL    GFR Non-African American >60 >60    GFR African American >60 >60    Calcium 9.5 8.3 - 10.6 mg/dL    Total Protein 8.3 (H) 6.4 - 8.2 g/dL    Albumin 5.2 (H) 3.4 - 5.0 g/dL    Albumin/Globulin Ratio 1.7 1.1 - 2.2    Total Bilirubin 3.2 (H) 0.0 - 1.0 mg/dL    Alkaline Phosphatase 85 40 - 129 U/L    ALT 71 (H) 10 - 40 U/L    AST 98 (H) 15 - 37 U/L   Troponin   Result Value Ref Range    Troponin <0.01 <0.01 ng/mL   Magnesium   Result Value Ref Range    Magnesium 1.40 (L) 1.80 - 2.40 mg/dL   D-Dimer, Quantitative   Result Value Ref Range    D-Dimer, Quant 261 (H) 0 - 229 ng/mL DDU   Troponin   Result Value Ref Range    Troponin <0.01 <0.01 ng/mL   COVID-19   Result Value Ref Range    SARS-CoV-2 Not Detected Not detected   EKG 12 Lead   Result Value Ref Range    Ventricular Rate 142 BPM    Atrial Rate 142 BPM    P-R Interval 134 ms    QRS Duration 78 ms    Q-T Interval 270 ms    QTc Calculation (Bazett) 415 ms    P Axis 70 degrees    R Axis 73 degrees    T Axis 35 degrees    Diagnosis       Sinus tachycardiaOtherwise normal ECGNo previous ECGs availableConfirmed by JUANITA MADISON, MIGUEL ([id number]) on 1/31/2022 5:57:44 PM       I estimate there is LOW risk for PULMONARY EMBOLISM, ACUTE CORONARY SYNDROME, OR THORACIC AORTIC DISSECTION, thus I consider the discharge disposition reasonable.  Ame Nathan and I have discussed the diagnosis and risks, and we agree with discharging home to follow-up with their primary doctor. We also discussed returning to the Emergency Department immediately if new or worsening symptoms occur. We have discussed the symptoms which are most concerning (e.g., bloody sputum, fever, worsening pain or shortness of breath, vomiting) that necessitate immediate return. FINAL Impression    1. Chest pain, unspecified type    2. Other fatigue    3. Nausea and vomiting, intractability of vomiting not specified, unspecified vomiting type    4. Viral illness    5. Hypokalemia    6. Hypomagnesemia        Blood pressure (!) 146/96, pulse 96, temperature 97.3 °F (36.3 °C), temperature source Oral, resp. rate 16, height 5' 7\" (1.702 m), weight 215 lb (97.5 kg), SpO2 99 %. PATIENT REFERRED TO:  Darcie Homans, JANINE - CNP  [address]  [phone]      for a recheck in 2-3  days    [address] [place] [address]  Schedule an appointment as soon as possible for a visit       [facility]  [address] [place] [address]  [phone]  Go to   If symptoms worsen      DISCHARGE MEDICATIONS:  Discharge Medication List as of 1/31/2022  5:53 PM      START taking these medications    Details   Multiple Vitamins-Minerals (MULTIVITAMIN WITH MINERALS) tablet Take 1 tablet by mouth daily, Disp-30 tablet, R-3Normal             DISCONTINUED MEDICATIONS:  Discharge Medication List as of 1/31/2022  5:53 PM                 Pt was seen during the [place] 19 pandemic. Appropriate PPE worn by ME during patient encounters. Pt seen during a time with constrained hospital bed capacity and other potential inpatient and outpatient resources were constrained due to the viral pandemic.    Please note that portions of this note were completed with a voice recognition program.  Efforts were made to edit the dictations but occasionally words are mis-transcribed.)    Shaina Tirado PA-C (electronically signed)        Idalia Stout PA-C  02/02/22 1105

## 2022-02-01 LAB — SARS-COV-2: NOT DETECTED

## 2022-06-15 ENCOUNTER — APPOINTMENT (OUTPATIENT)
Dept: CT IMAGING | Age: 27
End: 2022-06-15
Payer: COMMERCIAL

## 2022-06-15 ENCOUNTER — HOSPITAL ENCOUNTER (INPATIENT)
Age: 27
LOS: 2 days | Discharge: HOME | DRG: 084 | End: 2022-06-17
Attending: INTERNAL MEDICINE | Admitting: INTERNAL MEDICINE
Payer: COMMERCIAL

## 2022-06-15 ENCOUNTER — APPOINTMENT (OUTPATIENT)
Dept: CT IMAGING | Age: 27
DRG: 084 | End: 2022-06-15
Attending: INTERNAL MEDICINE
Payer: COMMERCIAL

## 2022-06-15 ENCOUNTER — HOSPITAL ENCOUNTER (EMERGENCY)
Age: 27
Discharge: ANOTHER HOSPITAL | End: 2022-06-15
Attending: STUDENT IN AN ORGANIZED HEALTH CARE EDUCATION/TRAINING PROGRAM
Payer: COMMERCIAL

## 2022-06-15 VITALS
HEIGHT: 68 IN | TEMPERATURE: 98 F | RESPIRATION RATE: 16 BRPM | OXYGEN SATURATION: 100 % | WEIGHT: 200 LBS | DIASTOLIC BLOOD PRESSURE: 99 MMHG | HEART RATE: 94 BPM | SYSTOLIC BLOOD PRESSURE: 129 MMHG | BODY MASS INDEX: 30.31 KG/M2

## 2022-06-15 DIAGNOSIS — R74.01 TRANSAMINITIS: ICD-10-CM

## 2022-06-15 DIAGNOSIS — I60.9 SUBARACHNOID HEMORRHAGE (HCC): Primary | ICD-10-CM

## 2022-06-15 DIAGNOSIS — E80.6 INDIRECT HYPERBILIRUBINEMIA: ICD-10-CM

## 2022-06-15 DIAGNOSIS — R55 SYNCOPE AND COLLAPSE: ICD-10-CM

## 2022-06-15 PROBLEM — I62.9 INTRACRANIAL HEMORRHAGE (HCC): Status: ACTIVE | Noted: 2022-06-15

## 2022-06-15 LAB
A/G RATIO: 2.2 (ref 1.1–2.2)
ALBUMIN SERPL-MCNC: 5.9 G/DL (ref 3.4–5)
ALP BLD-CCNC: 99 U/L (ref 40–129)
ALT SERPL-CCNC: 169 U/L (ref 10–40)
ANION GAP SERPL CALCULATED.3IONS-SCNC: 18 MMOL/L (ref 3–16)
AST SERPL-CCNC: 192 U/L (ref 15–37)
BASOPHILS ABSOLUTE: 0 K/UL (ref 0–0.2)
BASOPHILS RELATIVE PERCENT: 0.6 %
BILIRUB SERPL-MCNC: 5 MG/DL (ref 0–1)
BILIRUB SERPL-MCNC: 5.1 MG/DL (ref 0–1)
BILIRUBIN DIRECT: 0.7 MG/DL (ref 0–0.3)
BILIRUBIN, INDIRECT: 4.3 MG/DL (ref 0–1)
BUN BLDV-MCNC: 8 MG/DL (ref 7–20)
CALCIUM SERPL-MCNC: 10.5 MG/DL (ref 8.3–10.6)
CHLORIDE BLD-SCNC: 93 MMOL/L (ref 99–110)
CO2: 24 MMOL/L (ref 21–32)
CREAT SERPL-MCNC: 1 MG/DL (ref 0.9–1.3)
EKG ATRIAL RATE: 94 BPM
EKG DIAGNOSIS: NORMAL
EKG P AXIS: 69 DEGREES
EKG P-R INTERVAL: 138 MS
EKG Q-T INTERVAL: 376 MS
EKG QRS DURATION: 80 MS
EKG QTC CALCULATION (BAZETT): 470 MS
EKG R AXIS: 67 DEGREES
EKG T AXIS: 44 DEGREES
EKG VENTRICULAR RATE: 94 BPM
EOSINOPHILS ABSOLUTE: 0 K/UL (ref 0–0.6)
EOSINOPHILS RELATIVE PERCENT: 0.2 %
ETHANOL: NORMAL MG/DL (ref 0–0.08)
GFR AFRICAN AMERICAN: >60
GFR NON-AFRICAN AMERICAN: >60
GLUCOSE BLD-MCNC: 152 MG/DL (ref 70–99)
HCT VFR BLD CALC: 50.5 % (ref 40.5–52.5)
HEMOGLOBIN: 17.5 G/DL (ref 13.5–17.5)
INFLUENZA A: NOT DETECTED
INFLUENZA B: NOT DETECTED
INR BLD: 1.01 (ref 0.87–1.14)
LYMPHOCYTES ABSOLUTE: 0.5 K/UL (ref 1–5.1)
LYMPHOCYTES RELATIVE PERCENT: 7.1 %
MCH RBC QN AUTO: 33.4 PG (ref 26–34)
MCHC RBC AUTO-ENTMCNC: 34.6 G/DL (ref 31–36)
MCV RBC AUTO: 96.5 FL (ref 80–100)
MONOCYTES ABSOLUTE: 0.4 K/UL (ref 0–1.3)
MONOCYTES RELATIVE PERCENT: 5.4 %
NEUTROPHILS ABSOLUTE: 6.7 K/UL (ref 1.7–7.7)
NEUTROPHILS RELATIVE PERCENT: 86.7 %
PDW BLD-RTO: 15 % (ref 12.4–15.4)
PLATELET # BLD: 222 K/UL (ref 135–450)
PMV BLD AUTO: 7 FL (ref 5–10.5)
POTASSIUM REFLEX MAGNESIUM: 3.7 MMOL/L (ref 3.5–5.1)
PROTHROMBIN TIME: 13.1 SEC (ref 11.7–14.5)
RBC # BLD: 5.23 M/UL (ref 4.2–5.9)
SARS-COV-2 RNA, RT PCR: NOT DETECTED
SODIUM BLD-SCNC: 135 MMOL/L (ref 136–145)
TOTAL CK: 266 U/L (ref 39–308)
TOTAL PROTEIN: 8.6 G/DL (ref 6.4–8.2)
TROPONIN: <0.01 NG/ML
WBC # BLD: 7.7 K/UL (ref 4–11)

## 2022-06-15 PROCEDURE — 84484 ASSAY OF TROPONIN QUANT: CPT

## 2022-06-15 PROCEDURE — 70486 CT MAXILLOFACIAL W/O DYE: CPT

## 2022-06-15 PROCEDURE — 93010 ELECTROCARDIOGRAM REPORT: CPT | Performed by: INTERNAL MEDICINE

## 2022-06-15 PROCEDURE — 72125 CT NECK SPINE W/O DYE: CPT

## 2022-06-15 PROCEDURE — 93005 ELECTROCARDIOGRAM TRACING: CPT | Performed by: STUDENT IN AN ORGANIZED HEALTH CARE EDUCATION/TRAINING PROGRAM

## 2022-06-15 PROCEDURE — 2580000003 HC RX 258: Performed by: STUDENT IN AN ORGANIZED HEALTH CARE EDUCATION/TRAINING PROGRAM

## 2022-06-15 PROCEDURE — 82248 BILIRUBIN DIRECT: CPT

## 2022-06-15 PROCEDURE — 99285 EMERGENCY DEPT VISIT HI MDM: CPT

## 2022-06-15 PROCEDURE — 70450 CT HEAD/BRAIN W/O DYE: CPT

## 2022-06-15 PROCEDURE — 82550 ASSAY OF CK (CPK): CPT

## 2022-06-15 PROCEDURE — 85025 COMPLETE CBC W/AUTO DIFF WBC: CPT

## 2022-06-15 PROCEDURE — 96365 THER/PROPH/DIAG IV INF INIT: CPT

## 2022-06-15 PROCEDURE — 2580000003 HC RX 258: Performed by: INTERNAL MEDICINE

## 2022-06-15 PROCEDURE — 6370000000 HC RX 637 (ALT 250 FOR IP): Performed by: STUDENT IN AN ORGANIZED HEALTH CARE EDUCATION/TRAINING PROGRAM

## 2022-06-15 PROCEDURE — 6360000002 HC RX W HCPCS: Performed by: STUDENT IN AN ORGANIZED HEALTH CARE EDUCATION/TRAINING PROGRAM

## 2022-06-15 PROCEDURE — 85610 PROTHROMBIN TIME: CPT

## 2022-06-15 PROCEDURE — 6370000000 HC RX 637 (ALT 250 FOR IP): Performed by: INTERNAL MEDICINE

## 2022-06-15 PROCEDURE — 82077 ASSAY SPEC XCP UR&BREATH IA: CPT

## 2022-06-15 PROCEDURE — 36415 COLL VENOUS BLD VENIPUNCTURE: CPT

## 2022-06-15 PROCEDURE — 96375 TX/PRO/DX INJ NEW DRUG ADDON: CPT

## 2022-06-15 PROCEDURE — 96366 THER/PROPH/DIAG IV INF ADDON: CPT

## 2022-06-15 PROCEDURE — 2060000000 HC ICU INTERMEDIATE R&B

## 2022-06-15 PROCEDURE — 80053 COMPREHEN METABOLIC PANEL: CPT

## 2022-06-15 PROCEDURE — 87636 SARSCOV2 & INF A&B AMP PRB: CPT

## 2022-06-15 RX ORDER — HYDROCODONE BITARTRATE AND ACETAMINOPHEN 5; 325 MG/1; MG/1
1 TABLET ORAL EVERY 6 HOURS PRN
Status: DISCONTINUED | OUTPATIENT
Start: 2022-06-15 | End: 2022-06-17 | Stop reason: HOSPADM

## 2022-06-15 RX ORDER — ACETAMINOPHEN 650 MG/1
650 SUPPOSITORY RECTAL EVERY 6 HOURS PRN
Status: DISCONTINUED | OUTPATIENT
Start: 2022-06-15 | End: 2022-06-17 | Stop reason: HOSPADM

## 2022-06-15 RX ORDER — MORPHINE SULFATE 2 MG/ML
2 INJECTION, SOLUTION INTRAMUSCULAR; INTRAVENOUS EVERY 4 HOURS PRN
Status: DISCONTINUED | OUTPATIENT
Start: 2022-06-15 | End: 2022-06-17 | Stop reason: HOSPADM

## 2022-06-15 RX ORDER — ONDANSETRON 2 MG/ML
4 INJECTION INTRAMUSCULAR; INTRAVENOUS ONCE
Status: COMPLETED | OUTPATIENT
Start: 2022-06-15 | End: 2022-06-15

## 2022-06-15 RX ORDER — ACETAMINOPHEN 500 MG
1000 TABLET ORAL ONCE
Status: COMPLETED | OUTPATIENT
Start: 2022-06-15 | End: 2022-06-15

## 2022-06-15 RX ORDER — SODIUM CHLORIDE 0.9 % (FLUSH) 0.9 %
5-40 SYRINGE (ML) INJECTION PRN
Status: DISCONTINUED | OUTPATIENT
Start: 2022-06-15 | End: 2022-06-17 | Stop reason: HOSPADM

## 2022-06-15 RX ORDER — SODIUM CHLORIDE 0.9 % (FLUSH) 0.9 %
5-40 SYRINGE (ML) INJECTION EVERY 12 HOURS SCHEDULED
Status: DISCONTINUED | OUTPATIENT
Start: 2022-06-15 | End: 2022-06-17 | Stop reason: HOSPADM

## 2022-06-15 RX ORDER — SODIUM CHLORIDE 9 MG/ML
INJECTION, SOLUTION INTRAVENOUS PRN
Status: DISCONTINUED | OUTPATIENT
Start: 2022-06-15 | End: 2022-06-17 | Stop reason: HOSPADM

## 2022-06-15 RX ORDER — ONDANSETRON 4 MG/1
4 TABLET, ORALLY DISINTEGRATING ORAL EVERY 8 HOURS PRN
Status: DISCONTINUED | OUTPATIENT
Start: 2022-06-15 | End: 2022-06-17 | Stop reason: HOSPADM

## 2022-06-15 RX ORDER — ONDANSETRON 2 MG/ML
4 INJECTION INTRAMUSCULAR; INTRAVENOUS EVERY 6 HOURS PRN
Status: DISCONTINUED | OUTPATIENT
Start: 2022-06-15 | End: 2022-06-17 | Stop reason: HOSPADM

## 2022-06-15 RX ORDER — 0.9 % SODIUM CHLORIDE 0.9 %
1000 INTRAVENOUS SOLUTION INTRAVENOUS ONCE
Status: COMPLETED | OUTPATIENT
Start: 2022-06-15 | End: 2022-06-15

## 2022-06-15 RX ORDER — M-VIT,TX,IRON,MINS/CALC/FOLIC 27MG-0.4MG
TABLET ORAL DAILY
Status: DISCONTINUED | OUTPATIENT
Start: 2022-06-16 | End: 2022-06-17 | Stop reason: HOSPADM

## 2022-06-15 RX ORDER — SODIUM CHLORIDE, SODIUM LACTATE, POTASSIUM CHLORIDE, CALCIUM CHLORIDE 600; 310; 30; 20 MG/100ML; MG/100ML; MG/100ML; MG/100ML
INJECTION, SOLUTION INTRAVENOUS CONTINUOUS
Status: DISCONTINUED | OUTPATIENT
Start: 2022-06-15 | End: 2022-06-16

## 2022-06-15 RX ORDER — ACETAMINOPHEN 325 MG/1
650 TABLET ORAL EVERY 6 HOURS PRN
Status: DISCONTINUED | OUTPATIENT
Start: 2022-06-15 | End: 2022-06-17 | Stop reason: HOSPADM

## 2022-06-15 RX ORDER — LEVETIRACETAM 500 MG/1
500 TABLET ORAL 2 TIMES DAILY
Status: DISCONTINUED | OUTPATIENT
Start: 2022-06-15 | End: 2022-06-17 | Stop reason: HOSPADM

## 2022-06-15 RX ORDER — POLYETHYLENE GLYCOL 3350 17 G/17G
17 POWDER, FOR SOLUTION ORAL DAILY PRN
Status: DISCONTINUED | OUTPATIENT
Start: 2022-06-15 | End: 2022-06-17 | Stop reason: HOSPADM

## 2022-06-15 RX ADMIN — SODIUM CHLORIDE, POTASSIUM CHLORIDE, SODIUM LACTATE AND CALCIUM CHLORIDE: 600; 310; 30; 20 INJECTION, SOLUTION INTRAVENOUS at 21:38

## 2022-06-15 RX ADMIN — ONDANSETRON HYDROCHLORIDE 4 MG: 2 INJECTION, SOLUTION INTRAMUSCULAR; INTRAVENOUS at 18:21

## 2022-06-15 RX ADMIN — SODIUM CHLORIDE, PRESERVATIVE FREE 10 ML: 5 INJECTION INTRAVENOUS at 22:02

## 2022-06-15 RX ADMIN — SODIUM CHLORIDE 1000 ML: 9 INJECTION, SOLUTION INTRAVENOUS at 15:33

## 2022-06-15 RX ADMIN — LEVETIRACETAM 1000 MG: 100 INJECTION, SOLUTION INTRAVENOUS at 16:44

## 2022-06-15 RX ADMIN — ACETAMINOPHEN 1000 MG: 500 TABLET ORAL at 18:21

## 2022-06-15 RX ADMIN — LEVETIRACETAM 500 MG: 500 TABLET, FILM COATED ORAL at 22:51

## 2022-06-15 ASSESSMENT — PAIN SCALES - GENERAL: PAINLEVEL_OUTOF10: 5

## 2022-06-15 ASSESSMENT — PAIN DESCRIPTION - FREQUENCY: FREQUENCY: CONTINUOUS

## 2022-06-15 ASSESSMENT — PAIN DESCRIPTION - PAIN TYPE: TYPE: ACUTE PAIN

## 2022-06-15 ASSESSMENT — PAIN DESCRIPTION - LOCATION
LOCATION: HEAD
LOCATION: HEAD

## 2022-06-15 ASSESSMENT — LIFESTYLE VARIABLES
HOW OFTEN DO YOU HAVE A DRINK CONTAINING ALCOHOL: 2-3 TIMES A WEEK
HOW MANY STANDARD DRINKS CONTAINING ALCOHOL DO YOU HAVE ON A TYPICAL DAY: 5 OR 6

## 2022-06-15 ASSESSMENT — PAIN DESCRIPTION - ONSET: ONSET: PROGRESSIVE

## 2022-06-15 ASSESSMENT — PAIN - FUNCTIONAL ASSESSMENT: PAIN_FUNCTIONAL_ASSESSMENT: 0-10

## 2022-06-15 NOTE — Clinical Note
Pt is leaving with [employer]    1NS litr fluid  Keppra IVPB 1gm  4 Zofran ivpush  1gm Tylenol     From day shift RN

## 2022-06-15 NOTE — PLAN OF CARE
28 y.o. male with hx of gynecomastia and HTN who presented on 6/15/2022 to [facility] ED following a syncopal episode with fall. The patient was working outdoors when his friend heard a crash, and saw patient hit the concrete, and noted a raised area on his head and foaming of the mouth. Appears he has been [place]" for a few days- and also having \"muscle cramps\" for a few day                   he felt somewhat off about 30 minutes prior to losing consciousness and hitting the concrete. Maria Elena Dugan is unsure how long he was unconscious. Maria Elena Dugan denies taking blood thinners. States this has never happened to him before.  Reportedly, he had some foaming at the mouth however no episodes of body shaking.  States that he is having pain in his right ear and right jaw and does not feel that he can clench his jaw on the right side normally.  He denies any other injuries. Maria Elena Dugan states that he started to feel slightly better but is somewhat dizzy.  He denies any chest pain or shortness of breath.  He denies any other complaints or concerns.         /103,         ASS/PLAN      Syncope with fall  SAH with IPH  Elevated liver enzymes        Admit to 5T  Telemetry  Check tox screen, ethanol level  Repeat CMP, if still elevated, check RUQ USG  Neurochecks    Repeat head CT 6 hrs after last (around 9:40 PM.     NSGY consult          Possible heat stroke

## 2022-06-15 NOTE — ED PROVIDER NOTES
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 friends witnessed foaming of the mouth-has been having muscle cramps for afew day. c/o unable to hear out of right ear (side of head that hit concrete) and unable to clench jaw. LOC does not remember what happened)     HISTORY OF PRESENT ILLNESS  Musa Umanzor is a 28 y.o. male  who presents to the ED complaining of loss of consciousness, fall, right-sided ear and jaw pain. Patient states that he was at work and is working outdoors, states that he felt somewhat off about 30 minutes prior to this episode, and then he lost consciousness and hit the concrete. He is unsure how long he was unconscious. He denies taking blood thinners. Denies any previous medical history and states this never happened to him before. Reportedly, he had some foaming at the mouth however no episodes of body shaking. States that he woke up and was on the ground. States that he is having pain in his right ear and right jaw and does not feel that he can clench his jaw on the right side normally. He denies any other injuries. He states that he started to feel slightly better but is somewhat dizzy. He denies any chest pain or shortness of breath. He denies any other complaints or concerns. No other complaints, modifying factors or associated symptoms. I have reviewed the following from the nursing documentation. Past Medical History:   Diagnosis Date    Gynecomastia, male      Past Surgical History:   Procedure Laterality Date    OTHER SURGICAL HISTORY  march 10, 2016    gynecomastia removal bilat breasts    OTHER SURGICAL HISTORY Bilateral 08/08/2017    simple mastectomy     No family history on file.   Social History     Socioeconomic History    Marital status:      Spouse name: Not on file    Number of children: Not on file    Years of education: Not on file    Highest education level: Not on file   Occupational History    Not on file   Tobacco Use    Smoking status: Never Smoker    Smokeless tobacco: Never Used   Substance and Sexual Activity    Alcohol use: Yes     Comment: couple times a week    Drug use: No    Sexual activity: Yes   Other Topics Concern    Not on file   Social History Narrative    Not on file     Social Determinants of Health     Financial Resource Strain:     Difficulty of Paying Living Expenses: Not on file   Food Insecurity:     Worried About Running Out of Food in the Last Year: Not on file    Jake of Food in the Last Year: Not on file   Transportation Needs:     Lack of Transportation (Medical): Not on file    Lack of Transportation (Non-Medical): Not on file   Physical Activity:     Days of Exercise per Week: Not on file    Minutes of Exercise per Session: Not on file   Stress:     Feeling of Stress : Not on file   Social Connections:     Frequency of Communication with Friends and Family: Not on file    Frequency of Social Gatherings with Friends and Family: Not on file    Attends Hinduism Services: Not on file    Active Member of [address] [place] [address] or Organizations: Not on file    Attends Club or Organization Meetings: Not on file    Marital Status: Not on file   Intimate Partner Violence:     Fear of Current or Ex-Partner: Not on file    Emotionally Abused: Not on file    Physically Abused: Not on file    Sexually Abused: Not on file   Housing Stability:     Unable to Pay for Housing in the Last Year: Not on file    Number of [place] in the Last Year: Not on file    Unstable Housing in the Last Year: Not on file     No current facility-administered medications for this encounter.      Current Outpatient Medications   Medication Sig Dispense Refill    Multiple Vitamins-Minerals (MULTIVITAMIN WITH MINERALS) tablet Take 1 tablet by mouth daily 30 tablet 3     No Known Allergies    REVIEW OF SYSTEMS  10 systems reviewed, pertinent positives per HPI otherwise noted to be negative. PHYSICAL EXAM  BP (!) 130/95   Pulse 91   Temp 98 °F (36.7 °C) (Oral)   Resp 13   Ht 5' 8\" (1.727 m)   Wt 200 lb (90.7 kg)   SpO2 95%   BMI 30.41 kg/m²    GENERAL APPEARANCE: Awake and alert. Cooperative. No acute distress. HENT: Abrasion to R parietal scalp. Mucous membranes are moist. L TM clear. R TM with hemorrhagic effusion. NECK: Supple. EYES: PERRL. EOM's grossly intact. HEART/CHEST: RRR. No murmurs. LUNGS: Respirations unlabored. CTAB. Good air exchange. Speaking comfortably in full sentences. ABDOMEN: No tenderness. Soft. Non-distended. No masses. No organomegaly. No guarding or rebound. MUSCULOSKELETAL: No extremity edema. Compartments soft. No deformity. No tenderness in the extremities. All extremities neurovascularly intact. BACK: No midline tenderness palpation the C, T, or L-spine. No step-offs or obvious deformities. SKIN: Warm and dry. No acute rashes. NEUROLOGICAL: Alert and oriented. CN's 2-12 intact. No gross facial drooping. Strength 5/5, sensation intact. PSYCHIATRIC: Normal mood and affect. LABS  I have reviewed all labs for this visit. No results found for this visit on 06/15/22. ECG  The Ekg interpreted by me shows  normal sinus rhythm with a rate of 94, sinus arrhythmia  Axis is   Normal  QTc is  prolonged at 470  Intervals and Durations are unremarkable. ST Segments: nonspecific changes  Sinus tachycardia has resolved, nonspecific T wave abnormalities in lead III are new compared to previous performed 1/31/2022    RADIOLOGY  CT CERVICAL SPINE WO CONTRAST   Final Result   No acute abnormality of the cervical spine. CT FACIAL BONES WO CONTRAST   Final Result   No acute facial bone trauma. CT HEAD WO CONTRAST   Final Result      Subarachnoid blood noted in the lateral sulci of the left temporal lobe.    Linear hemorrhage noted in the left frontal lobe which may be either   intraparenchymal or in the subarachnoid space along and inferior sulcus. Right frontoparietal scalp hematoma noted. No fractures noted. No mass effect. I discussed the case with Dr. Juliana Win at 3:36 on 06/15/2022           ED COURSE/MDM  Patient seen and evaluated. Old records reviewed. Labs and imaging reviewed and results discussed with patient. Patient is a 26-year-old male, presenting with concerns for fall and syncopal episode with head trauma. Full HPI as detailed above. Upon arrival in the ED, vitals reassuring. Patient is resting comfortably and is in no acute distress. EKG without evidence of acute ischemia or dysrhythmias. He does have evidence of head trauma, neurologic exam is normal, CT of the head shows subarachnoid blood in the lateral sulci of the left temporal lobe and a linear hemorrhage noted in the left frontal lobe may be either intraparenchymal or subarachnoid and along with inferior sulcus. No fractures or mass-effect were noted. Patient is neurologically intact. I spoke to [facility] with the neurosurgery team at [facility].  Recommended 1 g of Keppra, repeat head CT if patient is still present in the department 6 hours after his injury, and blood pressure medications to maintain systolic blood pressure less than 160. Troponin is negative. Ethanol is negative. He does have an indirect hyperbilirubinemia and mild transaminitis. I spoke to Dr. Laura Candelario at [facility] who has graciously agreed to accept the patient as well. Patient will be transferred for further work-up and treatment of his condition. He is comfortable in agreement with plan of care. The total Critical Care time is 45 minutes which excludes separately billable procedures. Is this patient to be included in the SEP-1 Core Measure?   No   Exclusion criteria - the patient is NOT to be included for SEP-1 Core Measure due to:  Infection is not suspected     During the patient's ED course, the patient was given:  Medications - No data to display     CLINICAL IMPRESSION  No diagnosis found. Blood pressure (!) 130/95, pulse 91, temperature 98 °F (36.7 °C), temperature source Oral, resp. rate 13, height 5' 8\" (1.727 m), weight 200 lb (90.7 kg), SpO2 95 %. DISPOSITION  Rafael Mayo was transferred to [facility] in stable condition. Patient was given scripts for the following medications. I counseled patient how to take these medications. New Prescriptions    No medications on file       Follow-up with:  No follow-up provider specified. DISCLAIMER: This chart was created using Dragon dictation software. Efforts were made by me to ensure accuracy, however some errors may be present due to limitations of this technology and occasionally words are not transcribed correctly.        Jaz Taylor MD  06/15/22 [id number]

## 2022-06-15 NOTE — ED NOTES
46- [employer] is transporting [facility]    Report #- [id number] [facility]  06/15/22 [facility]  06/15/22 4743

## 2022-06-15 NOTE — PLAN OF CARE
NEUROSURGERY PROGRESS NOTE    Malika Goldstein  [id number]   1995   6/15/2022    ED physician: Acosta Purvis MD    Reason for call: Traumatic SAH    History of present illness: Patient is a 28 y.o. male w/ PMH of gynecomastia and HTN who presented on 6/15/2022 to [facility] ED s/p syncopal episode with LOC. According to Dr. Jess Valladares, the patient was at work and was working outdoors when he felt somewhat off about 30 minutes prior to losing consciousness and hitting the concrete. He is unsure how long he was unconscious. He denies taking blood thinners. States this has never happened to him before. Reportedly, he had some foaming at the mouth however no episodes of body shaking. States that he is having pain in his right ear and right jaw and does not feel that he can clench his jaw on the right side normally. He denies any other injuries. He states that he started to feel slightly better but is somewhat dizzy. He denies any chest pain or shortness of breath. He denies any other complaints or concerns. Physical Exam:     BP (!) 130/95   Pulse 91   Temp 98 °F (36.7 °C) (Oral)   Resp 13   Ht 5' 8\" (1.727 m)   Wt 200 lb (90.7 kg)   SpO2 95%   BMI 30.41 kg/m²   GCS per ED physician:  4 - Opens eyes on own  5 - Alert and oriented  6 - Follows simple motor commands    Radiological Findings:  CT HEAD WO CONTRAST  Result Date: 6/15/2022  Subarachnoid blood noted in the lateral sulci of the left temporal lobe. Linear hemorrhage noted in the left frontal lobe which may be either intraparenchymal or in the subarachnoid space along and inferior sulcus. Right frontoparietal scalp hematoma noted. No fractures noted. No mass effect. I discussed the case with Dr. Jess Valladares at 3:36 on 06/15/2022     CT FACIAL BONES WO CONTRAST  Result Date: 6/15/2022  No acute facial bone trauma. CT CERVICAL SPINE WO CONTRAST  Result Date: 6/15/2022  No acute abnormality of the cervical spine.      Assessment:  Patient is a 28 y.o. y/o male w/traumatic SAH s/p fall. Recommendations:  1. Neurologic exams frequency:  - Floor: Q4H  2. For change in exam MUST contact neurosurgery team  3. CT Head w/o contrast 6 hours from previous scan  4. Maintain SBP <160; If PRN med insufficient, then may start Nicardipine infusion  5. Keep Plt >100k & INR <1.4  6. Hold all anticoagulation & antiplatelet for 2 weeks  7. DVT Prophylaxis: SCD's  8. Seizure prophylaxis: Keppra 1000 mg loading dose then 500 mg BID  9. Detailed consult when patient arrives. DISPO: Transfer to [facility] with hospitalist as attending physician and Dr. Khadar Smith as consulting neurosurgeon.      Electronically signed by: Hartford Cogan, APRN - CNP, APRN-CNP, 6/15/2022 4:19 PM  [phone]

## 2022-06-16 LAB
A/G RATIO: 1.7 (ref 1.1–2.2)
ALBUMIN SERPL-MCNC: 4.5 G/DL (ref 3.4–5)
ALP BLD-CCNC: 78 U/L (ref 40–129)
ALT SERPL-CCNC: 110 U/L (ref 10–40)
ANION GAP SERPL CALCULATED.3IONS-SCNC: 12 MMOL/L (ref 3–16)
AST SERPL-CCNC: 101 U/L (ref 15–37)
BASOPHILS ABSOLUTE: 0 K/UL (ref 0–0.2)
BASOPHILS RELATIVE PERCENT: 0.8 %
BILIRUB SERPL-MCNC: 4 MG/DL (ref 0–1)
BUN BLDV-MCNC: 10 MG/DL (ref 7–20)
CALCIUM SERPL-MCNC: 8.9 MG/DL (ref 8.3–10.6)
CHLORIDE BLD-SCNC: 98 MMOL/L (ref 99–110)
CO2: 28 MMOL/L (ref 21–32)
CREAT SERPL-MCNC: 0.9 MG/DL (ref 0.9–1.3)
EOSINOPHILS ABSOLUTE: 0 K/UL (ref 0–0.6)
EOSINOPHILS RELATIVE PERCENT: 0.6 %
GFR AFRICAN AMERICAN: >60
GFR NON-AFRICAN AMERICAN: >60
GLUCOSE BLD-MCNC: 93 MG/DL (ref 70–99)
HCT VFR BLD CALC: 40.5 % (ref 40.5–52.5)
HEMOGLOBIN: 14.4 G/DL (ref 13.5–17.5)
LYMPHOCYTES ABSOLUTE: 1.1 K/UL (ref 1–5.1)
LYMPHOCYTES RELATIVE PERCENT: 17.5 %
MCH RBC QN AUTO: 34.5 PG (ref 26–34)
MCHC RBC AUTO-ENTMCNC: 35.6 G/DL (ref 31–36)
MCV RBC AUTO: 96.9 FL (ref 80–100)
MONOCYTES ABSOLUTE: 0.7 K/UL (ref 0–1.3)
MONOCYTES RELATIVE PERCENT: 10.5 %
NEUTROPHILS ABSOLUTE: 4.5 K/UL (ref 1.7–7.7)
NEUTROPHILS RELATIVE PERCENT: 70.6 %
PDW BLD-RTO: 14.8 % (ref 12.4–15.4)
PLATELET # BLD: 168 K/UL (ref 135–450)
PMV BLD AUTO: 6.7 FL (ref 5–10.5)
POTASSIUM REFLEX MAGNESIUM: 4.4 MMOL/L (ref 3.5–5.1)
RBC # BLD: 4.17 M/UL (ref 4.2–5.9)
SODIUM BLD-SCNC: 138 MMOL/L (ref 136–145)
TOTAL PROTEIN: 7.1 G/DL (ref 6.4–8.2)
WBC # BLD: 6.4 K/UL (ref 4–11)

## 2022-06-16 PROCEDURE — 2580000003 HC RX 258: Performed by: INTERNAL MEDICINE

## 2022-06-16 PROCEDURE — 85025 COMPLETE CBC W/AUTO DIFF WBC: CPT

## 2022-06-16 PROCEDURE — 36415 COLL VENOUS BLD VENIPUNCTURE: CPT

## 2022-06-16 PROCEDURE — 80053 COMPREHEN METABOLIC PANEL: CPT

## 2022-06-16 PROCEDURE — 2060000000 HC ICU INTERMEDIATE R&B

## 2022-06-16 PROCEDURE — 6370000000 HC RX 637 (ALT 250 FOR IP): Performed by: INTERNAL MEDICINE

## 2022-06-16 RX ADMIN — HYDROCODONE BITARTRATE AND ACETAMINOPHEN 1 TABLET: 5; 325 TABLET ORAL at 03:17

## 2022-06-16 RX ADMIN — LEVETIRACETAM 500 MG: 500 TABLET, FILM COATED ORAL at 08:39

## 2022-06-16 RX ADMIN — HYDROCODONE BITARTRATE AND ACETAMINOPHEN 1 TABLET: 5; 325 TABLET ORAL at 13:45

## 2022-06-16 RX ADMIN — HYDROCODONE BITARTRATE AND ACETAMINOPHEN 1 TABLET: 5; 325 TABLET ORAL at 21:05

## 2022-06-16 RX ADMIN — SODIUM CHLORIDE, PRESERVATIVE FREE 5 ML: 5 INJECTION INTRAVENOUS at 21:05

## 2022-06-16 RX ADMIN — LEVETIRACETAM 500 MG: 500 TABLET, FILM COATED ORAL at 21:05

## 2022-06-16 RX ADMIN — MULTIPLE VITAMINS W/ MINERALS TAB 1 TABLET: TAB at 08:39

## 2022-06-16 ASSESSMENT — PAIN DESCRIPTION - PAIN TYPE
TYPE: ACUTE PAIN
TYPE: ACUTE PAIN

## 2022-06-16 ASSESSMENT — PAIN SCALES - GENERAL
PAINLEVEL_OUTOF10: 5
PAINLEVEL_OUTOF10: 9
PAINLEVEL_OUTOF10: 0
PAINLEVEL_OUTOF10: 4
PAINLEVEL_OUTOF10: 3
PAINLEVEL_OUTOF10: 5
PAINLEVEL_OUTOF10: 7

## 2022-06-16 ASSESSMENT — PAIN DESCRIPTION - FREQUENCY
FREQUENCY: CONTINUOUS
FREQUENCY: CONTINUOUS

## 2022-06-16 ASSESSMENT — PAIN DESCRIPTION - LOCATION
LOCATION: HEAD

## 2022-06-16 ASSESSMENT — PAIN DESCRIPTION - ORIENTATION
ORIENTATION: RIGHT

## 2022-06-16 ASSESSMENT — PAIN - FUNCTIONAL ASSESSMENT
PAIN_FUNCTIONAL_ASSESSMENT: ACTIVITIES ARE NOT PREVENTED
PAIN_FUNCTIONAL_ASSESSMENT: ACTIVITIES ARE NOT PREVENTED

## 2022-06-16 ASSESSMENT — PAIN DESCRIPTION - ONSET
ONSET: ON-GOING
ONSET: ON-GOING

## 2022-06-16 ASSESSMENT — PAIN DESCRIPTION - DESCRIPTORS
DESCRIPTORS: ACHING;SORE
DESCRIPTORS: ACHING
DESCRIPTORS: ACHING;SORE

## 2022-06-16 NOTE — PROGRESS NOTES
4 Eyes Admission Assessment     I agree as the admission nurse that 2 RN's have performed a thorough Head to Toe Skin Assessment on the patient. ALL assessment sites listed below have been assessed on admission. Areas assessed by both nurses:   [x]   Head, Face, and Ears   [x]   Shoulders, Back, and Chest  [x]   Arms, Elbows, and Hands   [x]   Coccyx, Sacrum, and Ischium  [x]   Legs, Feet, and Heels   Hematoma right side head   Redness right side head     Does the Patient have Skin Breakdown?   No         Edison Prevention initiated:  No   Wound Care Orders initiated:  No      [facility] nurse consulted for Pressure Injury (Stage 3,4, Unstageable, DTI, NWPT, and Complex wounds) or Edison score 18 or lower:  No      Nurse 1 eSignature: Electronically signed by Adriana Martinez RN on 6/16/22 at 1:42 AM EDT    **SHARE this note so that the co-signing nurse is able to place an eSignature**    Nurse 2 eSignature: Electronically signed by Cheri Gramajo RN on 6/16/22 at 6:05 AM EDT

## 2022-06-16 NOTE — CONSULTS
I have seen and personally examined the patient, I have verified all portions of the history and all objective data are accurately documented, and I agree with the assessment and plan documented by NP student. JANINE Kenney-CNP  Neurosurgery Nurse Practitioner  [phone]            NEUROSURGERY Lenn Riding  [id number]   1995 6/16/2022    Requesting physician: Keeyl Brunner MD    Reason for consultation: Traumatic SAH    History of present illness: Patient is a 28 y.o. male w/ PMH of Gynecomastia who presented on 6/16/2022 with a traumatic SAH. Pt presented to a OSH ED after sustaining a fall on concrete while working on a pool liner that was filled with green algae. 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 He stated \"I slipped and fell and next thing I know im under a shady tree with my employee and the landlord of the house looking over me\". He endorses loss of consciousness for about one minute but said it could have been longer. His employee endorsed that after the fall he had myoclonic movement and white foam in the mouth. He endorses that he has dual bite bumps in his mouth. Patient endorses pain in the back of his head, right ear and right jaw pain with transient inability to open his jaw. He denies chest pain or shortness of breath. Denies dizziness and urinary incontinence. Denies taking blood thinners. At OSH ED CT Head showed subarachnoid blood noted in the lateral sulci of the left temporal lobe. Linear hemorrhage noted in the left frontal lobe which may be either intraparenchymal or in the subarachnoid space along and inferior sulcus.     Repeat CT Head showed decreased density of left subarachnoid hemorrhage with stable appearance and no evidence of expansion, new parenchymal hemorrhage or extra-axial fluid collections    Patient endorses a slight headache on pain scale 4/10 and slight neck stiffness    ROS:   GENERAL:  Denies fever or recent illness. Denies weight changes   HEAD: Right scalp hematoma and bruise, Slight neck stiffness  EYES:  Denies vision change or diplopia  EARS:  Denies hearing loss  CARDIAC:  Denies chest pain  RESPIRATORY:  Denies shortness of breath  SKIN:  Denies rash or lesions   HEM:  Denies excessive bruising  PSYCH:  Denies anxiety or depression  NEURO:  Headache. Denies numbness or tingling or lateralizing weakness   :  Denies urinary difficulty  GI: Denies nausea, vomiting, diarrhea or constipation  MUSCULOSKELETAL:  No arthralgias    No Known Allergies    Past Medical History:   Diagnosis Date    Gynecomastia, male         Past Surgical History:   Procedure Laterality Date    OTHER SURGICAL HISTORY  march 10, 2016    gynecomastia removal bilat breasts    OTHER SURGICAL HISTORY Bilateral 08/08/2017    simple mastectomy       Social History     Occupational History    Not on file   Tobacco Use    Smoking status: Never Smoker    Smokeless tobacco: Never Used   Substance and Sexual Activity    Alcohol use: Yes     Comment: couple times a week    Drug use: No    Sexual activity: Yes        No family history on file. No outpatient medications have been marked as taking for the 6/15/22 encounter [facility] Encounter).       Current Facility-Administered Medications   Medication Dose Route Frequency Provider Last Rate Last Admin    therapeutic multivitamin-minerals   Oral Daily Yesika Winslow MD   1 tablet at 06/16/22 0839    sodium chloride flush 0.9 % injection 5-40 mL  5-40 mL IntraVENous 2 times per day Yesika Winslow MD   10 mL at 06/15/22 2202    sodium chloride flush 0.9 % injection 5-40 mL  5-40 mL IntraVENous PRN Yesika Winslow MD        0.9 % sodium chloride infusion   IntraVENous PRN Yesika Winslow MD        ondansetron (ZOFRAN-ODT) disintegrating tablet 4 mg  4 mg Oral Q8H PRN Yesika Winslow MD        Or    ondansetron (ZOFRAN) injection 4 mg  4 mg IntraVENous Q6H PRN Sourav Salas MD        polyethylene glycol (GLYCOLAX) packet 17 g  17 g Oral Daily PRN Sourav Salas MD        acetaminophen (TYLENOL) tablet 650 mg  650 mg Oral Q6H PRN Sourav Salas MD        Or    acetaminophen (TYLENOL) suppository 650 mg  650 mg Rectal Q6H PRN Sourav Salas MD        morphine (PF) injection 2 mg  2 mg IntraVENous Q4H PRN Sourav Salas MD        HYDROcodone-acetaminophen (NORCO) 5-325 MG per tablet 1 tablet  1 tablet Oral Q6H PRN Sourav Salas MD   1 tablet at 06/16/22 0317    perflutren lipid microspheres (DEFINITY) injection 1.65 mg  1.5 mL IntraVENous ONCE PRN Sourav Salas MD        levETIRAcetam (KEPPRA) tablet 500 mg  500 mg Oral BID Sourav Salas MD   500 mg at 06/16/22 0839      Objective:  BP (!) 148/77   Pulse 94   Temp 98.2 °F (36.8 °C) (Oral)   Resp 16   Ht 5' 8\" (1.727 m)   Wt 200 lb (90.7 kg)   SpO2 98%   BMI 30.41 kg/m²     Physical Exam:  Patient seen and examined   General: Well developed. Alert and cooperative in no acute distress. HENT: Right scalp hematoma and bruise. Slight Neck stiffness  Eyes: Optic discs: Not tested  Pulmonary: unlabored respiratory effort  Cardiovascular:  Warm well perfused.  No peripheral edema  Gastrointestinal: abdomen soft, NT, ND    Neurologic Exam:  Neurological:  Mental Status: Awake, alert, oriented x 4, speech clear and appropriate  Attention: Intact  Language: No aphasia or dysarthria noted  Sensation: Intact to all extremities to light touch  Coordination: Intact    Cranial Nerves:  Cranial Nerves:  II: Visual acuity not tested, denies new visual changes / diplopia  III, IV, VI: PERRL, 3 mm bilaterally, EOMI, no nystagmus noted  V: Facial sensation intact bilaterally to touch  VII: Face symmetric  VIII: Hearing intact bilaterally to spoken voice  IX: Palate movement equal bilaterally  XI: Shoulder shrug equal bilaterally  XII: Tongue midline    Musculoskeletal:   Gait: Not tested   Assist devices: None   Tone: normal  Motor strength:    Right  Left    Right  Left    Deltoid  5 5  Hip Flex  5 5   Biceps  5 5  Knee Extensors  5 5   Triceps  5 5  Knee Flexors  5 5   Wrist Ext  5 5  Ankle Dorsiflex. 5 5   Wrist Flex  5 5  Ankle Plantarflex. 5 5   Handgrip  5 5  Ext Son Longus  5 5   Thumb Ext  5 5             Radiological Findings:  CT HEAD WO CONTRAST   Final Result   1. Decreased density of left subarachnoid hemorrhage with stable appearance and no evidence of expansion, new parenchymal hemorrhage or extra-axial fluid collections. Labs  Recent Labs     06/15/22  1413 06/16/22  0656   * 138   CL 93* 98*   CO2 24 28   BUN 8 10   CREATININE 1.0 0.9   GLUCOSE 152* 93     Recent Labs     06/15/22  1413 06/16/22  0656   WBC 7.7 6.4   RBC 5.23 4.17*   INR 1.01  --          Patient Active Problem List    Diagnosis Date Noted    Intracranial hemorrhage ([facility] [address] 06/15/2022    Gynecomastia        Assessment:  Patient is a 28 y.o. male w/ PMH of Gynecomastia who presented on 6/16/2022 with a traumatic SAH that was transferred from [facility] ED. Patient was with wife at bedside both very pleasant. Plan:  1. No neurosurgical intervention indicated  2. Repeat head CT stable to improved   3. Neuro exam: Q4 hour exams  4. Seizure prophylaxis: Continue Keppra 500mg BID fpr 7 days   5. Maintain SBP <160  6. Keep Plt >100k & INR <1.4  7. Hold all anticoagulation & antiplatelets for 2 weeks  8. DVT prophylaxis: SCD's, okay for chemo prophylaxis 24 hours after stable head CT if medically indicated   9. Pain management: per primary team  10. Activity: As tolerated  11. Diet: Per primary team  12. Thank you for consult, we will continue to follow. Please call with quetsions.              DISPO- Remain inpatient overnight for observation    Donna Loya  Neurosurgery Student Nurse Practitioner  [phone]    Patient was seen and examined with Dr. Maryjane Choudhary who agrees with above assessment and plan.      Electronically signed by: Ester Paul, 6/16/2022 10:31 AM

## 2022-06-16 NOTE — PLAN OF CARE
Problem: Discharge Planning  Goal: Discharge to home or other facility with appropriate resources  Outcome: Progressing  Flowsheets  Taken 6/16/2022 0205 by Reid Singh RN  Discharge to home or other facility with appropriate resources:   Identify barriers to discharge with patient and caregiver   Arrange for needed discharge resources and transportation as appropriate   Refer to discharge planning if patient needs post-hospital services based on physician order or complex needs related to functional status, cognitive ability or social support system   Arrange for interpreters to assist at discharge as needed   Identify discharge learning needs (meds, wound care, etc)       Problem: Safety - Adult  Goal: Free from fall injury  Outcome: Progressing  Note: Bed in lowest position. Bed alarm and gripper socks on. Call light in reach of pt.

## 2022-06-16 NOTE — CARE COORDINATION
of care/goals and shares the quality data associated with the providers.  Not Indicated    Care Transition patient: No    Socorro Andres MSW  Dahlia Avila  Case Management Department  Ph: [phone]   Fax: [phone]

## 2022-06-16 NOTE — H&P
History and Physical    Admit Date: 6/15/2022    Patient's PCP: Dr. Afua Rollins primary care provider on file. Chief complaint: syncope with fall        HISTORY OF PRESENT ILLNESS:    This is a very pleasant 28 y.o. male who was transferred from [facility] following a syncopal episode. 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 His friend heard a crash, and saw patient hit the concrete, and noted a raised area on his head and reportedly, some foaming of the mouth. No episodes of body shaking. He has no memory after the fall, and remembers getting up surrounded by his friend and pool owner. [facility] is unsure how long he was unconscious, but thinks whole episode lasted about 2 minutes. He also remembers decreased hearing from the right ear for secs to minutes following this, and also transient inability to open his jaw.      He per ED Physician, although he did not tell me this, has been [place]" for a few days- and also been having \"muscle cramps\" . No previous episodes in the past      He denies any chest pain or shortness of breath. No fever/chills or urinary complaints. In the ED, he was mildly tachycardic, otherwise normal vitals. CBC was normal.  CMP showed elevated liver enzymes with ALT, AST and total bilirubin at 169, 192 and 5.1 respectively. Head CT showed subarachnoid blood and possibly intraparenchymal bleed as well on account of which she was transferred here for further evaluation and management.         Past Medical / Surgical History:    Past Medical History:   Diagnosis Date    Gynecomastia, male        Past Surgical History:   Procedure Laterality Date    OTHER SURGICAL HISTORY  march 10, 2016    gynecomastia removal bilat breasts    OTHER SURGICAL HISTORY Bilateral 08/08/2017    simple mastectomy       Medications Prior to Admission:    No current facility-administered medications on file prior to encounter. Current Outpatient Medications on File Prior to Encounter   Medication Sig Dispense Refill    Multiple Vitamins-Minerals (MULTIVITAMIN WITH MINERALS) tablet Take 1 tablet by mouth daily (Patient not taking: Reported on 6/15/2022) 30 tablet 3       Allergies:  Patient has no known allergies. Social History:   TOBACCO:   reports that he has never smoked. He has never used smokeless tobacco.     ETOH:   reports current alcohol use. Family History:   No family history on file. ROS: Review of Systems - Negative except as in HPI. .   All other systems reviewed and are negative. PHYSICAL EXAM:  /78   Pulse (!) 101   Temp 98.2 °F (36.8 °C) (Oral)   Resp 16   Ht 5' 8\" (1.727 m)   Wt 200 lb (90.7 kg)   SpO2 100%   BMI 30.41 kg/m²     No results for input(s): POCGLU in the last 72 hours. General appearance: alert, appears stated age and cooperative  Head: Right scalp hematoma and bruise  Nose: Nares normal. Septum midline. Mucosa normal. No drainage or sinus tenderness.   Neck: no adenopathy, no carotid bruit, no JVD, supple, symmetrical, trachea midline and thyroid not enlarged, symmetric, no tenderness/mass/nodules  Lungs: clear to auscultation bilaterally  Heart: regular rate and rhythm, S1, S2 normal, no murmur, click, rub or gallop  Abdomen: soft, non-tender; bowel sounds normal; no masses,  no organomegaly  Extremities: extremities normal, atraumatic, no cyanosis or edema  Pulses: 2+ and symmetric  Skin: Skin color, texture, turgor normal. No rashes or lesions  Neurologic: Grossly normal       LABS:  Recent Labs     06/15/22  1413   WBC 7.7   HGB 17.5   HCT 50.5                                                                     Recent Labs     06/15/22  1413   *   K 3.7   CL 93*   CO2 24   BUN 8   CREATININE 1.0   GLUCOSE 152*     Recent Labs     06/15/22  1413   *   *   BILITOT 5.0*  5.1*   ALKPHOS 99     Recent Labs 06/15/22  1413   TROPONINI <0.01     No results for input(s): BNP in the last 72 hours. No results found for: PHART, PUK6FRD, PO2ART  Recent Labs     06/15/22  1413   INR 1.01     No results for input(s): NITRITE, COLORU, PHUR, LABCAST, WBCUA, RBCUA, MUCUS, TRICHOMONAS, YEAST, BACTERIA, CLARITYU, SPECGRAV, LEUKOCYTESUR, UROBILINOGEN, BILIRUBINUR, BLOODU, GLUCOSEU, AMORPHOUS in the last 72 hours. Invalid input(s): Dionne Odor       Assessment & Plan:     28 y.o. male who was transferred from [facility] following a syncopal episode. Fall with closed head injury  Traumatic SAH with possible IPH  Elevated liver enzymes        CT face and cervical spine: No acute facial bone trauma or abnormality of the cervical spine. CT head: Subarachnoid blood noted in the lateral sulci of the left temporal lobe. Linear hemorrhage noted in the left frontal lobe which may be either intraparenchymal or in the subarachnoid space along and inferior sulcus. Right frontoparietal scalp hematoma noted. No fractures noted. No mass effect. Patient fell in a pool he was cleaning. Possible heat stroke, due to heat wave on day of presentation played a role      He has elevated liver enzymes, hepatocellular injry pattern, but o/w normal labs       Admit to 5T  Telemetry  Check ethanol level  Repeat CMP, if still elevated, check RUQ USG    Repeat head CT 6 hrs after last (around 9:40 PM)     Keppra 1000 mg loading dose given in the ED: Continue 500 mg BID    Telemetry  Keep SBP < 160  HOB> 30 deg  Avoid NSAIDS/AC  Neurochecks  NSGY consulted         The patient and / or the family were informed of the results of any tests, a time was given to answer questions, a plan was proposed and they agreed with plan. Thank you Dr. Arteaga primary care provider on file. for the opportunity to be involved in this patients care. If you have any questions or concerns please feel free to contact me at [phone].   Full Code    Job Newton, MD

## 2022-06-16 NOTE — PROGRESS NOTES
Pt arrived to [facility] via stretcher from [facility]. A&O x4, VSS. 4 eyes skin assessment performed with another nurse at this time. Belongings given to pt and pt oriented to room. Call light in reach of pt.

## 2022-06-16 NOTE — PROGRESS NOTES
History and Physical    Admit Date: 6/15/2022    Patient's PCP: Dr. Emerson Clemente primary care provider on file. Chief complaint: syncope with fall        HISTORY OF PRESENT ILLNESS:    This is a very pleasant 28 y.o. male who was transferred from [facility] following a syncopal episode. 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 His friend heard a crash, and saw patient hit the concrete, and noted a raised area on his head and reportedly, some foaming of the mouth. No episodes of body shaking. He has no memory after the fall, and remembers getting up surrounded by his friend and pool owner. [facility] is unsure how long he was unconscious, but thinks whole episode lasted about 2 minutes. He also remembers decreased hearing from the right ear for secs to minutes following this, and also transient inability to open his jaw.      He per ED Physician, although he did not tell me this, has been [place]" for a few days- and also been having \"muscle cramps\" . No previous episodes in the past      He denies any chest pain or shortness of breath. No fever/chills or urinary complaints. In the ED, he was mildly tachycardic, otherwise normal vitals. CBC was normal.  CMP showed elevated liver enzymes with ALT, AST and total bilirubin at 169, 192 and 5.1 respectively. Head CT showed subarachnoid blood and possibly intraparenchymal bleed as well on account of which she was transferred here for further evaluation and management. Interval HPI  No new complaints  No chest pain  No dyspnea    Still headache, site of fall, but feels better    Wife at bedside    Pxt admits to ETOH consumption, \". ..a few times a week\"        Medications: Reviewed         Allergies:  Patient has no known allergies. Social History:   TOBACCO:   reports that he has never smoked.  He has never used smokeless tobacco.     ETOH:   reports current alcohol use. Family History:   No family history on file. ROS: Review of Systems - Negative except as in HPI. .   All other systems reviewed and are negative. PHYSICAL EXAM:  BP (!) 148/77   Pulse 94   Temp 98.2 °F (36.8 °C) (Oral)   Resp 16   Ht 5' 8\" (1.727 m)   Wt 200 lb (90.7 kg)   SpO2 98%   BMI 30.41 kg/m²     No results for input(s): POCGLU in the last 72 hours. General appearance: alert, appears stated age and cooperative  Head: Right scalp hematoma and bruise  Nose: Nares normal. Septum midline. Mucosa normal. No drainage or sinus tenderness. Neck: no adenopathy, no carotid bruit, no JVD, supple, symmetrical, trachea midline and thyroid not enlarged, symmetric, no tenderness/mass/nodules  Lungs: clear to auscultation bilaterally  Heart: regular rate and rhythm, S1, S2 normal, no murmur, click, rub or gallop  Abdomen: soft, non-tender; bowel sounds normal; no masses,  no organomegaly  Extremities: extremities normal, atraumatic, no cyanosis or edema  Pulses: 2+ and symmetric  Skin: Skin color, texture, turgor normal. No rashes or lesions  Neurologic: Grossly normal       LABS:  Recent Labs     06/15/22  1413 06/16/22  0656   WBC 7.7 6.4   HGB 17.5 14.4   HCT 50.5 40.5    168                                                                  Recent Labs     06/15/22  1413 06/16/22  0656   * 138   K 3.7 4.4   CL 93* 98*   CO2 24 28   BUN 8 10   CREATININE 1.0 0.9   GLUCOSE 152* 93     Recent Labs     06/15/22  1413 06/16/22  0656   * 101*   * 110*   BILITOT 5.0*  5.1* 4.0*   ALKPHOS 99 78     Recent Labs     06/15/22  1413   TROPONINI <0.01     No results for input(s): BNP in the last 72 hours.   No results found for: PHART, JLX7GDO, PO2ART  Recent Labs     06/15/22  1413   INR 1.01     No results for input(s): NITRITE, COLORU, PHUR, LABCAST, WBCUA, RBCUA, MUCUS, TRICHOMONAS, YEAST, BACTERIA, CLARITYU, SPECGRAV, LEUKOCYTESUR, UROBILINOGEN, BILIRUBINUR, BLOODU, GLUCOSEU, AMORPHOUS in the last 72 hours. Invalid input(s): Brayan Harvey       Assessment & Plan:     28 y.o. male who was transferred from [facility] following a syncopal episode. Fall with closed head injury  Traumatic SAH with possible IPH  Elevated liver enzymes        CT face and cervical spine: No acute facial bone trauma or abnormality of the cervical spine. CT head: Subarachnoid blood noted in the lateral sulci of the left temporal lobe. Linear hemorrhage noted in the left frontal lobe which may be either intraparenchymal or in the subarachnoid space along and inferior sulcus. Right frontoparietal scalp hematoma noted. No fractures noted. No mass effect. Patient fell in a pool he was cleaning, appears mechanical fall. Possible heat exhaustion, due to heat wave on day of presentation played a role    Repeat head CT 6 hrs after previous: stable    Seen by NSGY: No neurosurgical intervention indicated    Keppra 1000 mg loading dose given in the ED: Continue 500 mg BID for 7 days    Keep SBP < 160  HOB> 30 deg  Avoid NSAIDS/AC  Neuro-checks Q 4    He has elevated liver enzymes, but o/w normal labs  Admits to ETOH \". ..a few times a week. Yue Anita Yue Anita Yue Anita \"  Negative abdominal exam.   Monitor CMP: trending down  Monitor      Transfer from ICU if ok with NSGY         The patient and / or the family were informed of the results of any tests, a time was given to answer questions, a plan was proposed and they agreed with plan.       Full Code       Disposition:   - Tx from ICU if OK with [address] [place] [address],[address] home within 24 hrs if ok with Jennifer Toure MD

## 2022-06-16 NOTE — PROGRESS NOTES
Pt A&O x4, VSS. No acute changes in neuro status thus far. C/o to right side of head. Pt also stating he is having right ear and jaw pain and is saying hearing is muffled in right ear since his fall yesterday. Pain being managed with medications per MAR. Tolerating diet and fluids with no issues. Voiding adequately. Fall precautions in place. Pt in bed with call light in reach.

## 2022-06-16 NOTE — PLAN OF CARE
Problem: Discharge Planning  Goal: Discharge to home or other facility with appropriate resources  6/16/2022 1144 by Salud Chavarria RN  Outcome: Progressing  6/16/2022 0205 by Lawrence Martinez RN  Outcome: Progressing  Flowsheets  Taken 6/16/2022 0205 by Lawrence Martinez RN  Discharge to home or other facility with appropriate resources:   Identify barriers to discharge with patient and caregiver   Arrange for needed discharge resources and transportation as appropriate   Refer to discharge planning if patient needs post-hospital services based on physician order or complex needs related to functional status, cognitive ability or social support system   Arrange for interpreters to assist at discharge as needed   Identify discharge learning needs (meds, wound care, etc)  Taken 6/15/2022 2050 by Johann Nuñez RN  Discharge to home or other facility with appropriate resources:   Identify barriers to discharge with patient and caregiver   Identify discharge learning needs (meds, wound care, etc)     Problem: Safety - Adult  Goal: Free from fall injury  6/16/2022 1144 by Salud Chavarria RN  Outcome: Progressing  6/16/2022 0205 by Lawrence Martinez RN  Outcome: Progressing  Note: Bed in lowest position. Bed alarm and gripper socks on. Call light in reach of pt.      Problem: Pain  Goal: Verbalizes/displays adequate comfort level or baseline comfort level  Outcome: Progressing

## 2022-06-17 VITALS
HEART RATE: 91 BPM | BODY MASS INDEX: 30.31 KG/M2 | DIASTOLIC BLOOD PRESSURE: 72 MMHG | SYSTOLIC BLOOD PRESSURE: 112 MMHG | WEIGHT: 200 LBS | RESPIRATION RATE: 18 BRPM | HEIGHT: 68 IN | TEMPERATURE: 98.3 F | OXYGEN SATURATION: 97 %

## 2022-06-17 LAB
A/G RATIO: 1.7 (ref 1.1–2.2)
ALBUMIN SERPL-MCNC: 4.4 G/DL (ref 3.4–5)
ALP BLD-CCNC: 76 U/L (ref 40–129)
ALT SERPL-CCNC: 94 U/L (ref 10–40)
ANION GAP SERPL CALCULATED.3IONS-SCNC: 15 MMOL/L (ref 3–16)
AST SERPL-CCNC: 83 U/L (ref 15–37)
BILIRUB SERPL-MCNC: 2.1 MG/DL (ref 0–1)
BUN BLDV-MCNC: 8 MG/DL (ref 7–20)
CALCIUM SERPL-MCNC: 8.9 MG/DL (ref 8.3–10.6)
CHLORIDE BLD-SCNC: 98 MMOL/L (ref 99–110)
CO2: 25 MMOL/L (ref 21–32)
CREAT SERPL-MCNC: 0.8 MG/DL (ref 0.9–1.3)
GFR AFRICAN AMERICAN: >60
GFR NON-AFRICAN AMERICAN: >60
GLUCOSE BLD-MCNC: 90 MG/DL (ref 70–99)
POTASSIUM REFLEX MAGNESIUM: 4 MMOL/L (ref 3.5–5.1)
SODIUM BLD-SCNC: 138 MMOL/L (ref 136–145)
TOTAL PROTEIN: 7 G/DL (ref 6.4–8.2)

## 2022-06-17 PROCEDURE — 80053 COMPREHEN METABOLIC PANEL: CPT

## 2022-06-17 PROCEDURE — 97530 THERAPEUTIC ACTIVITIES: CPT

## 2022-06-17 PROCEDURE — 36415 COLL VENOUS BLD VENIPUNCTURE: CPT

## 2022-06-17 PROCEDURE — 2580000003 HC RX 258: Performed by: INTERNAL MEDICINE

## 2022-06-17 PROCEDURE — 6370000000 HC RX 637 (ALT 250 FOR IP): Performed by: INTERNAL MEDICINE

## 2022-06-17 PROCEDURE — 97165 OT EVAL LOW COMPLEX 30 MIN: CPT

## 2022-06-17 RX ORDER — LEVETIRACETAM 500 MG/1
500 TABLET ORAL 2 TIMES DAILY
Qty: 14 TABLET | Refills: 0 | Status: SHIPPED | OUTPATIENT
Start: 2022-06-17 | End: 2022-07-13

## 2022-06-17 RX ADMIN — LEVETIRACETAM 500 MG: 500 TABLET, FILM COATED ORAL at 08:29

## 2022-06-17 RX ADMIN — SODIUM CHLORIDE, PRESERVATIVE FREE 10 ML: 5 INJECTION INTRAVENOUS at 08:30

## 2022-06-17 RX ADMIN — MULTIPLE VITAMINS W/ MINERALS TAB 1 TABLET: TAB at 08:29

## 2022-06-17 RX ADMIN — HYDROCODONE BITARTRATE AND ACETAMINOPHEN 1 TABLET: 5; 325 TABLET ORAL at 08:29

## 2022-06-17 ASSESSMENT — PAIN SCALES - GENERAL: PAINLEVEL_OUTOF10: 4

## 2022-06-17 NOTE — PLAN OF CARE
Problem: Safety - Adult  Goal: Free from fall injury  6/17/2022 0021 by Bismark Alicea RN  Outcome: Progressing  Patient remains free from falls during this shift. Patient is up x1 person standby assist. Bed is in the lowest position and the bed alarm is activated. Anti-slip socks are on. Call light is within reach. Will continue to monitor and reassess. Problem: Pain  Goal: Verbalizes/displays adequate comfort level or baseline comfort level  6/17/2022 0021 by Bismark Alicea RN  Outcome: Progressing  RN assesses pain using 0-10 scale. Patient understands how to rate pain using 0-10 scale. Pain is controled with medication per MAR. RN encourages patient to call out for breakthrough pain. Will continue to monitor and reassess.

## 2022-06-17 NOTE — PROGRESS NOTES
Physical Therapy  Discharge Note    Orders received and chart reviewed. Pt found sitting up in bed eating lunch. Pt reports up with OT earlier this morning, ambulating halls and performing steps without any difficulty. Pt denies any balance changes or need for PT evaluation. Pt hoping to return home today. Acute PT evaluation not indicated. Will sign off. Pt in agreement with plan. RN made aware.     Miri Jasso

## 2022-06-17 NOTE — PROGRESS NOTES
NEUROSURGERY PROGRESS NOTE    Brennan Barrera   [id number]   1995 6/17/2022    Interval History: NAEO. Neurologically stable. Hospital Day #2      Subjective: sitting up in bed working with therapy. No complaints, feels good and is very eager to get out of the hospital.     Objective:  /78   Pulse 75   Temp 97 °F (36.1 °C) (Oral)   Resp 18   Ht 5' 8\" (1.727 m)   Wt 200 lb (90.7 kg)   SpO2 98%   BMI 30.41 kg/m²     Labs:  Recent Labs     06/15/22  1413 06/16/22  0656 06/17/22  0534   * 138 138   CL 93* 98* 98*   CO2 24 28 25   BUN 8 10 8   CREATININE 1.0 0.9 0.8*   GLUCOSE 152* 93 90     Recent Labs     06/15/22  1413 06/16/22  0656   WBC 7.7 6.4   RBC 5.23 4.17*   INR 1.01  --      CT HEAD WO CONTRAST   Final Result   1. Decreased density of left subarachnoid hemorrhage with stable appearance and no evidence of expansion, new parenchymal hemorrhage or extra-axial fluid collections. Neurologic Exam:  GCS:  4 - Opens eyes on own  5 - Alert and oriented  6 - Follows simple motor commands    Mental Status: Awake, alert, oriented x 4, speech clear and appropriate  Language: No aphasia or dysarthria noted  Sensation: Intact to all extremities to light touch    Cranial Nerves:  II: Visual acuity not tested, visual fields intact, denies new visual changes / diplopia  III, IV, VI: PERRL, 3 mm bilaterally, EOMI, no nystagmus noted  V: Facial sensation intact bilaterally to touch  VII: Face symmetric  VIII: Hearing intact bilaterally to spoken voice  IX: Palate movement equal bilaterally  XI: Shoulder shrug equal bilaterally  XII: Tongue midline    Musculoskeletal:   Gait: Not tested   Tone: normal   Motor strength:    Right  Left    Right  Left    Deltoid  5 5  Hip Flex  5 5   Biceps  5 5  Knee Extensors  5 5   Triceps  5 5  Knee Flexors  5 5   Wrist Ext  5 5  Ankle Dorsiflex. 5 5   Wrist Flex  5 5  Ankle Plantarflex.   5 5   Handgrip  5 5  Ext Son Longus  5 5   Thumb Ext  5 5 Assessment   27 yo male w/ fall and head trauma, stable traumatic SAH on head CT. Plan:  1. No neurosurgical intervention indicated  2. Repeat head CT stable to improved   3. Neuro exam: Q4  4. Seizure prophylaxis: Continue Keppra 500mg BID for 7 days   5. Maintain SBP <160  6. Keep Plt >100k & INR <1.4  7. Hold all anticoagulation & antiplatelets for 2 weeks  8. Pain management: per primary team  9. Activity: As tolerated  10. Diet: Per primary team  11. No neurosurgical follow up needed, we will sign off. Please call with questions. DISPO-ok to dc from NSGY standpoint, final dispo timing to be determined by primary team once patient is medically stable for discharge. JANINE Morgan-CNP  Neurosurgery Nurse Practitioner  [phone]    Patient was seen and examined with Dr. Aida Crowell who agrees with above assessment and plan. Electronically signed by:  JANINE Horn NP, 6/17/2022 7:11 AM

## 2022-06-17 NOTE — DISCHARGE SUMMARY
Hospital Discharge Summary    Patient's PCP: No primary care provider on file. Admit Date: 6/15/2022   Discharge Date: 6/17/2022    Admitting Physician: Dr. Rojas Schroeder MD  Discharge Physician: Dr. Rojas Schroeder MD   Consults: neurology and  neurosurgery    HPI: 28 y.o. male who was transferred from [facility] following a syncopal episode.       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 His friend heard a crash, and saw patient hit the concrete, and noted a raised area on his head and reportedly, some foaming of the mouth. No episodes of body shaking. He has no memory after the fall, and remembers getting up surrounded by his friend and pool owner. [place] is unsure how long he was unconscious, but thinks whole episode lasted about 2 minutes. He also remembers decreased hearing from the right ear for secs to minutes following this, and also transient inability to open his jaw.      He per ED Physician, although he did not tell me this, has been \"shaky\" for a few days- and also been having \"muscle cramps\" .     No previous episodes in the past      He denies any chest pain or shortness of breath. No fever/chills or urinary complaints.     In the ED, he was mildly tachycardic, otherwise normal vitals. CBC was normal.  CMP showed elevated liver enzymes with ALT, AST and total bilirubin at 169, 192 and 5.1 respectively.     Head CT showed subarachnoid blood and possibly intraparenchymal bleed as well on account of which she was transferred here for further evaluation and management.            Brief hospital course:  Given the concern of the patients presentation and the concern of the possible multi-factorial etiology contributing to patients symptomatology.  Patient was admitted and evaluated and found to have:       Discharge Diagnoses:     Fall with closed head injury  Traumatic SAH with possible IPH  Elevated liver enzymes        CT face and cervical spine: No acute facial bone trauma or abnormality of the cervical spine.       CT head: Subarachnoid blood noted in the lateral sulci of the left temporal lobe. Linear hemorrhage noted in the left frontal lobe which may be either intraparenchymal or in the subarachnoid space along and inferior sulcus. Right frontoparietal scalp hematoma noted. No fractures noted.  No mass effect.        Patient fell in a pool he was cleaning, appears mechanical fall. Possible heat exhaustion, due to heat wave on day of presentation played a role     Repeat head CT 6 hrs after previous: stable     Seen by NSGY: No neurosurgical intervention indicated     Keppra 1000 mg loading dose given in the ED: Continue 500 mg BID for 7 days     Keep SBP < 160  Avoid NSAIDS/AC    Per NSGY: No neurosurgical follow up needed      He has elevated liver enzymes, but o/w normal labs  Admits to ETOH \". ..a few times a week. Arlene  Arlene  Arlene  \"  Negative abdominal exam.   Monitor CMP: Continues to trend down  Monitor o/p with repeat CMP in 5-7 days    Follow up with PCP              Physical Exam: /72   Pulse 91   Temp 98.3 °F (36.8 °C) (Oral)   Resp 18   Ht 5' 8\" (1.727 m)   Wt 200 lb (90.7 kg)   SpO2 97%   BMI 30.41 kg/m²     No results for input(s): POCGLU in the last 72 hours. General appearance: alert, appears stated age and cooperative  Head: Right scalp hematoma and bruise  Nose: Nares normal. Septum midline. Mucosa normal. No drainage or sinus tenderness.   Neck: no adenopathy, no carotid bruit, no JVD, supple, symmetrical, trachea midline and thyroid not enlarged, symmetric, no tenderness/mass/nodules  Lungs: clear to auscultation bilaterally  Heart: regular rate and rhythm, S1, S2 normal, no murmur, click, rub or gallop  Abdomen: soft, non-tender; bowel sounds normal; no masses,  no organomegaly  Extremities: extremities normal, atraumatic, no cyanosis or edema  Pulses: 2+ and symmetric  Skin: Skin color, texture, turgor normal. No rashes or lesions  Neurologic: Grossly normal    LABS:  Recent Labs     06/16/22  0656   WBC 6.4   HGB 14.4         Recent Labs     06/17/22  0534      K 4.0   CL 98*   CO2 25   BUN 8   CREATININE 0.8*   GLUCOSE 90     Recent Labs     06/15/22  1413   INR 1.01           Discharge Medications:  Current Discharge Medication List           Details   levETIRAcetam (KEPPRA) 500 MG tablet Take 1 tablet by mouth 2 times daily for 7 days  Qty: 14 tablet, Refills: 0              Details   Multiple Vitamins-Minerals (MULTIVITAMIN WITH MINERALS) tablet Take 1 tablet by mouth daily  Qty: 30 tablet, Refills: 3           Activity: activity as tolerated  Diet: regular diet  Wound Care: keep wound clean and dry    Disposition: home  Discharged Condition: Stable  Follow Up: Primary Care Physician in one week    Total time spent on discharge, finalizing medications, referrals and arranging outpatient follow up was more than 30 minutes    Thank you Dr. Arteaga primary care provider on file. for the opportunity to be involved in this patients care. If you have any questions or concerns please feel free to contact me at [phone].

## 2022-06-17 NOTE — PROGRESS NOTES
Patient is alert and oriented. Vital signs are stable. No changes in neuro assessment. NIH is 0. Patient's pain is controlled with medication per MAR. Patient ambulates x1 SBA. Patient tolerates ambulation well. Bed is in the lowest position. Bed alarm is activated. Call light is within reach. Will continue to monitor and reassess.

## 2022-06-17 NOTE — PROGRESS NOTES
I had the pleasure of taking care of Mr. Salud Cannon. Bedside report was performed and I introduced myself before updating the white board and performing an initial assessment of the patient and obtaining vitals. Explained the purpose of the white board, today's date, and how to call out for assistance. Patient is comfortable possessions and position adjusted for ease of use. Vitals for today's shift were as follows. .. Vitals:    06/16/22 1843 06/16/22 2345 06/17/22 0630 06/17/22 1116   BP: 126/77 (!) 143/77 138/78 112/72   Pulse: 77 73 75 91   Resp: 17 18 18 18   Temp: 98.3 °F (36.8 °C) 98.3 °F (36.8 °C) 97 °F (36.1 °C) 98.3 °F (36.8 °C)   TempSrc: Oral Oral Oral Oral   SpO2: 98% 96% 98% 97%   Weight:       Height:            LDAs have been examined and maintained including IVs flushed and patent. Additional concerns from this shift that were addressed include   - Discharge instructions reviewed with pt and family, All LDAs removed, no questions or concerns at this time. Pt discharged to private car.

## 2022-06-17 NOTE — PROGRESS NOTES
Occupational Therapy  Facility/Department: [place] [address]  Occupational Therapy Initial Assessment/Treatment    Name: Thea Lin  : 1995  MRN: [id number]  Date of Service: 2022    Discharge Recommendations:    Thea Lin scored a 24/24 on the AM-PAC ADL Inpatient form. At this time, no further OT is recommended upon discharge due to pt performing at baseline. Recommend patient returns to prior setting with prior services. Patient Diagnosis(es): There were no encounter diagnoses. Past Medical History:  has a past medical history of Gynecomastia, male. Past Surgical History:  has a past surgical history that includes other surgical history (march 10, 2016) and other surgical history (Bilateral, 2017). Assessment   Assessment: pt is a 28 y.o. male presenting near baseline this date. Pt ambulated in room and halls >250 ft independently w/o AD. No LOB or fatigue noted. Pt reports feeling \"normal\" despite achey from laying in bed. Pt has no concerns regarding safe DC home. OT will DC from services as pt performing at baseline. Prognosis: Good  Decision Making: Low Complexity  REQUIRES OT FOLLOW-UP: No  Activity Tolerance  Activity Tolerance: Patient Tolerated treatment well              Restrictions  Position Activity Restriction  Other position/activity restrictions: up with assist    Subjective   General  Chart Reviewed: Yes  Additional Pertinent Hx: pt is a 28 y.o. male with no pertinant PMHx. Presents following fall on pool deck. CT + subarachnoid blood and possibly intraparenchymal bleed. Referring Practitioner: Neymar Morel  Diagnosis: intracranial  hemorrage  Subjective  Subjective: pt in bed upon arrival, wife at bedside. Agreeable to OT evaluation.      Social/Functional History  Social/Functional History  Lives With: Spouse (2 small children, wife due with third this week)  Type of Home: House  Home Layout: One level  Home Access: Level entry (curb step)  Bathroom Shower/Tub: Tub/Shower unit  Bathroom Toilet: Standard  Has the patient had two or more falls in the past year or any fall with injury in the past year?: Yes (admitting)  ADL Assistance: [address]: Independent  Homemaking Responsibilities: Yes  Ambulation Assistance: Independent  Transfer Assistance: Independent  Active : Yes  Occupation: Full time employment  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[address], owns company       Objective   Heart Rate: 91  Heart Rate Source: Monitor  BP: 112/72  BP Location: Left upper arm  BP Method: Automatic  Patient Position: Semi fowlers  MAP (Calculated): 85.33  Resp: 18  SpO2: 97 %  O2 Device: None (Room air)  Hearing: Within functional limits (some popping in hear, reduced hearing in right, been steadily improven)          Safety Devices  Type of Devices: Call light within reach; Left in bed;Nurse notified  Balance  Sitting: Intact  Standing: Intact  Gait  Overall Level of Assistance: Independent (pt ambulated ~250 ft independently and completed 3 stairs withould LOB or any concerns)     AROM: Within functional limits  PROM: Within functional limits  Strength: Within functional limits  Coordination: Within functional limits  Tone: Normal  Sensation: Intact  ADL  Additional Comments: pt declined need        Bed mobility  Supine to Sit: Modified independent  Sit to Supine: Modified independent  Scooting: Independent  Transfers  Sit to stand: Independent  Stand to sit: Independent  Transfer Comments: I without AD     Cognition  Overall Cognitive Status: WNL                  Education Given To: Patient  Education Provided: Role of Therapy;Plan of Care; Fall Prevention Strategies  Education Method: Verbal  Barriers to Learning: None  Education Outcome: Verbalized understanding                                                                     AM-PAC Score        AM-PAC Inpatient Daily Activity Raw Score: 24 (06/17/22 1245)  AM-PAC Inpatient ADL T-Scale Score : 57.54 (06/17/22 1245)  ADL Inpatient CMS 0-100% Score: 0 (06/17/22 1245)  ADL Inpatient CMS G-Code Modifier : CH (06/17/22 1245)              Therapy Time   Individual Concurrent Group Co-treatment   Time In 1001         Time Out 1025         Minutes 24           Timed code tx minutes: 9  Total tx minutes: 24         Stephen Jenkins, OT

## 2022-06-24 ENCOUNTER — OFFICE VISIT (OUTPATIENT)
Dept: INTERNAL MEDICINE CLINIC | Age: 27
End: 2022-06-24
Payer: COMMERCIAL

## 2022-06-24 VITALS
WEIGHT: 180.9 LBS | HEIGHT: 68 IN | BODY MASS INDEX: 27.41 KG/M2 | OXYGEN SATURATION: 97 % | TEMPERATURE: 96.4 F | SYSTOLIC BLOOD PRESSURE: 130 MMHG | RESPIRATION RATE: 16 BRPM | DIASTOLIC BLOOD PRESSURE: 87 MMHG | HEART RATE: 102 BPM

## 2022-06-24 DIAGNOSIS — I62.9 INTRACRANIAL HEMORRHAGE (HCC): Primary | ICD-10-CM

## 2022-06-24 DIAGNOSIS — H91.91 HEARING LOSS OF RIGHT EAR, UNSPECIFIED HEARING LOSS TYPE: ICD-10-CM

## 2022-06-24 DIAGNOSIS — E80.6 HYPERBILIRUBINEMIA: ICD-10-CM

## 2022-06-24 DIAGNOSIS — Z09 HOSPITAL DISCHARGE FOLLOW-UP: ICD-10-CM

## 2022-06-24 DIAGNOSIS — R74.01 TRANSAMINITIS: ICD-10-CM

## 2022-06-24 PROCEDURE — 99213 OFFICE O/P EST LOW 20 MIN: CPT | Performed by: STUDENT IN AN ORGANIZED HEALTH CARE EDUCATION/TRAINING PROGRAM

## 2022-06-24 RX ORDER — ACETAMINOPHEN 500 MG
500 TABLET ORAL EVERY 6 HOURS PRN
COMMUNITY

## 2022-06-24 NOTE — PATIENT INSTRUCTIONS
Your liver enzymes are elevated. Please have blood tests drawn next week as we discussed. We are checking for infections that can affect the liver. You do not need an appointment for blood test. Walk into any [email or web address] lab with papers provided. Also schedule an ultrasound of the liver with the number provided. For your hearing loss, please call number provided to schedule an appointment.

## 2022-06-24 NOTE — PROGRESS NOTES
Outpatient Clinic New Patient Note    Patient: Aliya Scott  : 1995 (70 y.o.)  Date: 2022    CC: Hospital discharge. Establish care. HPI:      Reviewed notes from recent discharge. Unclear whether he passed out first then fell, or if he slipped then passed out. Was admitted with traumatic SAH. Reports he did not have food prior to incident. Denies any further falls or passing out. Any CP, SOB, nausea, vomiting. No changes in vision. Hearing decreased in Rt ear. Memory issues are improving. Elevated Liver enzymes. Drinks 2-3 nights a week. 4 drinks each time. Hx of tattoos (last one in . 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 Last use . No smoking, illicit drug use. Past Medical History:    Past Medical History:   Diagnosis Date    Gynecomastia, male        Past Surgical History:  Past Surgical History:   Procedure Laterality Date    OTHER SURGICAL HISTORY  march 10, 2016    gynecomastia removal bilat breasts    OTHER SURGICAL HISTORY Bilateral 2017    simple mastectomy       Home Meds:  Prior to Visit Medications    Medication Sig Taking? Authorizing Provider   acetaminophen (TYLENOL) 500 MG tablet Take 500 mg by mouth every 6 hours as needed for Pain Yes Historical Provider, MD   levETIRAcetam (KEPPRA) 500 MG tablet Take 1 tablet by mouth 2 times daily for 7 days Yes Rodrick Dykes MD       Allergies:    Patient has no known allergies. Family History:   No family history on file. Review of Systems  A 10-organ Review Of Systems was obtained and otherwise unremarkable except as per HPI.     Immunization History   Administered Date(s) Administered    Adenovirus, Type 4 And 7, Live, Oral (Admin As 2 Tab) 2013    Anthrax (BioThrax) 2013, 2013    Hepatitis A Adult (Havrix, Vaqta) 2013    Hepatitis A/Hepatitis B (Twinrix) 2013, 2013    Hepatitis B 2013    Influenza Virus Vaccine 01/14/2013, 12/03/2013, 12/09/2013    Influenza, Live, Intranasal, Quadv, (Flumist 2-49 yrs) 10/06/2014, 11/04/2015    Meningococcal MCV4P (Menactra) 01/10/2013    Polio IPV (IPOL) 01/10/2013    Tdap (Boostrix, Adacel) 01/10/2013    Typhoid Vi capsular polysaccharide (Typhim VI) 05/03/2013    Vaccinia - Smallpox ([id number]) 05/03/2013       Health Maintenance Due   Topic Date Due    COVID-19 Vaccine (1) Never done    Depression Screen  Never done    HIV screen  Never done    Hepatitis C screen  Never done    Varicella vaccine (1 of 2 - 2-dose childhood series) Never done       Data: Old records have been reviewed electronically. PHYSICAL EXAM:  /87 (Site: Left Upper Arm, Position: Sitting, Cuff Size: Medium Adult)   Pulse (!) 102   Temp (!) 96.4 °F (35.8 °C) (Temporal)   Resp 16   Ht 5' 8\" (1.727 m)   Wt 180 lb 14.4 oz (82.1 kg)   SpO2 97%   BMI 27.51 kg/m²   Physical Exam    Assessment & Plan:      1. Intracranial hemorrhage ([address]  -Asked to finish keppra course. 2. Transaminitis  -Will get repeat check again. Will get hepatitis panel, RUQ ultrasound. - Comprehensive Metabolic Panel; Future  - CBC with Auto Differential; Future  - US LIVER; Future  - Hep C AB RLFX HCV PCR-A; Future  - Hepatitis B Surface Antibody; Future  - Hepatitis B Surface Antigen; Future  - Hepatitis A Antibody, IgM; Future  - HIV Screen; Future  - LIPID PANEL; Future    3. Hyperbilirubinemia  -As above  - Comprehensive Metabolic Panel; Future  - CBC with Auto Differential; Future  - US LIVER; Future    4. Hearing loss of right ear, unspecified hearing loss type  - Cassie Novoa MD, Otolaryngology, [facility]    5. Hospital discharge follow-up  - [place] DISCHARGE MEDS RECONCILED W/ CURRENT OUTPATIENT MED LIST      Return in about 1 month (around 7/24/2022).     Dispo: Pt has been staffed with Dr. Monica Mazariegos  _______________  Faina Buchanan MD, 6/24/2022 11:32 AM   PGY-3

## 2022-07-13 ENCOUNTER — OFFICE VISIT (OUTPATIENT)
Dept: ENT CLINIC | Age: 27
End: 2022-07-13
Payer: COMMERCIAL

## 2022-07-13 ENCOUNTER — PROCEDURE VISIT (OUTPATIENT)
Dept: AUDIOLOGY | Age: 27
End: 2022-07-13
Payer: COMMERCIAL

## 2022-07-13 VITALS — WEIGHT: 180 LBS | TEMPERATURE: 97.3 F | HEIGHT: 68 IN | BODY MASS INDEX: 27.28 KG/M2

## 2022-07-13 DIAGNOSIS — H90.11 CONDUCTIVE HEARING LOSS OF RIGHT EAR WITH UNRESTRICTED HEARING OF LEFT EAR: ICD-10-CM

## 2022-07-13 DIAGNOSIS — R42 DIZZINESS: Primary | ICD-10-CM

## 2022-07-13 DIAGNOSIS — S02.19XD CLOSED FRACTURE OF TEMPORAL BONE WITH ROUTINE HEALING, SUBSEQUENT ENCOUNTER: Primary | ICD-10-CM

## 2022-07-13 DIAGNOSIS — H93.11 TINNITUS, RIGHT EAR: ICD-10-CM

## 2022-07-13 DIAGNOSIS — J34.89 RHINORRHEA: ICD-10-CM

## 2022-07-13 PROBLEM — S02.19XA CLOSED FRACTURE OF TEMPORAL BONE (HCC): Status: ACTIVE | Noted: 2022-07-13

## 2022-07-13 PROCEDURE — 99204 OFFICE O/P NEW MOD 45 MIN: CPT | Performed by: OTOLARYNGOLOGY

## 2022-07-13 PROCEDURE — 92567 TYMPANOMETRY: CPT | Performed by: AUDIOLOGIST

## 2022-07-13 PROCEDURE — 92557 COMPREHENSIVE HEARING TEST: CPT | Performed by: AUDIOLOGIST

## 2022-07-13 ASSESSMENT — ENCOUNTER SYMPTOMS
SHORTNESS OF BREATH: 0
ABDOMINAL PAIN: 0
WHEEZING: 0

## 2022-07-13 NOTE — Clinical Note
Dr. Hussein Orona,    Thank you for your referral for audiometric testing on this patient. Please see the scanned audiogram (under \"Media\" tab) and encounter note for details. If you have any questions, or if there is anything else you need, please let me know.       Yue Miranda  Audiologist  ---  [id number] [facility] - Audiology

## 2022-07-13 NOTE — PROGRESS NOTES
David Chang   1995, 28 y.o. male   [id number]       Referring Provider: Ernestine Casper MD  Referral Type: In an order in [address] [place] [address]    Reason for Visit: Evaluation of the cause of disorders of hearing, tinnitus, or balance. ADULT AUDIOLOGIC EVALUATION      David Chang is a 28 y.o. male seen today, 7/13/2022 , for an initial audiologic evaluation. Patient was seen by Ernestine Casper MD following today's evaluation. AUDIOLOGIC AND OTHER PERTINENT MEDICAL HISTORY:      David Chnag noted decreased hearing and tinnitus in the right ear for the past ~1 month. He notes fall from collapsing with head injury to right side of head on 6/15/22. Recent imaging reportedly unremarkable. Patient reports intermittent dizziness since fall. He also notes family history of hearing loss in sister, surgery to remove left ear cyst as a child, and history of noise exposure from serving in the [employer] for 4 years. No additional significant otologic or medical history was reported. David Chang denied otalgia, aural fullness, otorrhea and tinnitus. Date: 7/13/2022     IMPRESSIONS:      Today's results revealed hearing within normal limits in the left ear and conductive hearing loss in the right with excellent word recognition, bilaterally. Air-bone gaps > 10 dBHL noted from 2-4kHz in the right ear. Follow medical recommendations of Ernestine Casper MD.     ASSESSMENT AND FINDINGS:     Otoscopy revealed: Clear ear canals bilaterally    RIGHT EAR:  Hearing Sensitivity:Within normal limits through 2kHz sloping to a mild conductive hearing loss from 3-8kHz  Speech Recognition Threshold: 10 dB HL  Word Recognition: Excellent (100%), based on NU-6 25-word list at 60 dBHL masked using recorded speech stimuli. Tympanometry: Normal peak pressure and compliance, Type A tympanogram, consistent with normal middle ear function.   Acoustic Reflexes: Ipsilateral: Could not maintain seal. Contralateral: Could not maintain seal.      LEFT EAR:  Hearing Sensitivity: Hearing sensitivity within normal limits from 250-8000 Hz   Speech Recognition Threshold: 5 dB HL  Word Recognition: Excellent (100%), based on NU-6 25-word list at 50 dBHL using recorded speech stimuli. Tympanometry: Normal peak pressure and compliance, Type A tympanogram, consistent with normal middle ear function. Acoustic Reflexes: Ipsilateral: Could not maintain seal. Contralateral: Could not maintain seal.      Reliability: Good   Transducer: Inserts    **NOTE: Air-bone gaps > 10 dBHL noted from 2-4kHz in the right ear. See scanned audiogram dated 7/13/2022  for results. PATIENT EDUCATION:       The following items were discussed with the patient:   - Good Communication Strategies  - Assistive Listening Devices  - Noise-Induced Hearing Loss and use of Hearing Protection Devices (HPDs)   - Dizziness    Educational information was shared in the After Visit Summary. RECOMMENDATIONS:                                                                                                                                                                                                                                                            The following items are recommended based on patient report and results from today's appointment:   - Continue medical follow-up with Bev Velasquez MD.   - Retest hearing as medically indicated and/or sooner if a change in hearing is noted. - Utilize \"Good Communication Strategies\" as discussed to assist in speech understanding with communication partners. - Maintain a sound enriched environment to assist in the management of tinnitus symptoms.  - Use hearing protection devices (HPDs), such as protective ear muffs and ear plugs, when exposed to dangerous sound levels.   - If medically indicated, consider vestibular evaluation to further investigate symptoms of dizziness.        Chaka Velasco [facility]  Audiologist    Chart CC'd to: Ashley Mata MD      Degree of   Hearing Sensitivity dB Range   Within Normal Limits (WNL) 0 - 20   Mild 20 - 40   Moderate 40 - 55   Moderately-Severe 55 - 70   Severe 70 - 90   Profound 90 +

## 2022-07-13 NOTE — PATIENT INSTRUCTIONS
Good Communication Strategies    Communication can be challenging for anyone, but can be especially difficult for those with some degree of hearing loss. While we may not be able to control every factor that may lead to difficulty with communication, there are Good Communication Strategies that we can all use in our day-to-day lives. Communication takes both parties working together for it to be successful. Tips as a Listener:   1. Control your environment. It is important to limit the amount of background noise in the room when possible. You should also consider having a good light source in the room to best see the other person. 2. Ask for clarification. Instead of saying \"What?\", you can use parts of what you heard to make a new question. For example, if you heard the word \"Thursday\" but not the rest of the week, you may ask \"What was that about Thursday? \" or \"What did you want to do Thursday? \". This shows the person talking that you are listening and will help them better explain what they are saying. 3. Be an advocate for yourself. If you are hearing but not understanding, tell the other person \"I can hear you, but I need you to slow down when you speak. \"  Or if someone is facing the other direction, say \"I cannot hear you when you are not looking at me when we talk. \"       Tips as a Talker:   - Sit or stand 3 to 6 feet away to maximize audibility         -- It is unrealistic to believe someone else will fully hear your message if you are speaking from across the room or in a different room in the house   - Stay at eye level to help with visual cues   - Make sure you have the persons attention before speaking   - Use facial expressions and gestures to accentuate your message   - Raise your voice slightly (do not scream)   - Speak slowly and distinctly   - Use short, simple sentences   - Rephrase your words if the person is having a hard time understanding you    - To avoid distortion, dont speak directly into a persons ear      Some additional items that may be helpful:   - Remain patient - this is important for both parties   - Write down items that still cannot be heard/understood. You may write with pen/paper or consider typing/texting on a cell phone or smart device. - If background noise is unavoidable, try to keep yourself in a good position in the room. By sitting at a villeda on the side of the restaurant (preferably a corner), it will be easier to communicate than if you were sitting at a table in the middle with background noise surrounding you. Keep yourself positioned away from music speakers or heavy foot traffic. Tinnitus: Overview and Management Strategies          Many people have some ringing sounds in their ears once in a while. You may hear a roar, a hiss, a tinkle, or a buzz. The sound usually lasts only a few minutes. If it goes on all the time, you may have tinnitus. Tinnitus is usually caused by long-term exposure to loud noise. This damages the nerves in the inner ear. It can occur with all types of hearing loss. It may be a symptom of almost any ear problem. Tinnitus may be caused by a buildup of earwax. Or, it may be caused by ear infections or certain medicines (especially antibiotics or large amounts of aspirin). You can also hear noises in your ears because of an injury to the ears, drinking too much alcohol or caffeine, or a medical condition. Other conditions may also contribute to tinnitus, including: head and neck trauma, temporomandibular joint disorder (TMJ), sinus pressure and barometric trauma, traumatic brain injury, metabolic disorders, autoimmune disorders, stress, and high blood pressure. You may need tests to evaluate your hearing and to find causes of long-lasting tinnitus. Your doctor may suggest one or more treatments to help you cope with the tinnitus. You can also do things at home to help reduce symptoms.     Follow-up care is a key part of your treatment and safety. Be sure to make and go to all appointments, and call your doctor if you are having problems. It's also a good idea to know your test results and keep a list of the medicines you take. How can you care for yourself at home? · Limit or cut out alcohol, caffeine, and sodium. They can make your symptoms worse. · Do not smoke or use other tobacco products. Nicotine reduces blood flow to the ear and makes tinnitus worse. If you need help quitting, talk to your doctor about stop-smoking programs and medicines. These can increase your chances of quitting for good. · Talk to your doctor about whether to stop taking aspirin and similar products such as ibuprofen or naproxen. · Get exercise often. It can help improve blood flow to the ear. Ways to manage/cope with tinnitus  Some tinnitus may last a long time. To manage your tinnitus, try to:  · Avoid noises that you think caused your tinnitus. If you can't avoid loud noises, wear earplugs or earmuffs. · Ignore the sound by paying attention to other things. Keeping your brain busy with other tasks or background noise can help your brain not focus on the tinnitus. · Try to not give the tinnitus an emotional reaction. Do your best to ignore the sound and not let it bother you. Relax using biofeedback, meditation, or yoga. Feeling stressed and being tired can make tinnitus worse. · Play music or white noise to help you sleep. Background noise may cover up the noise that you hear in your ears. You can buy a tabletop machine or a device that sits under your pillow to play soothing sounds, like ocean waves. · Smart phones have free apps, such as Whist, Relax Melodies, ReSound Relief, and White Noise Lite. These apps have different types of sounds/noise, some of which you can blend together to find sounds that are most soothing to you.   · Hearing aid technology, especially when there is some hearing loss, may help reduce tinnitus symptoms by giving your brain better access to the sounds it is missing. There are some hearing aids with built-in noise generator programs, which may help when amplification alone is not enough. Additional resources may be found through the American Tinnitus Association at www.alda.org    When should you call for help? Call 911 anytime you think you may need emergency care. For example, call if:    · You have symptoms of a stroke. These may include:  ? Sudden numbness, tingling, weakness, or loss of movement in your face, arm, or leg, especially on only one side of your body. ? Sudden vision changes. ? Sudden trouble speaking. ? Sudden confusion or trouble understanding simple statements. ? Sudden problems with walking or balance. ? A sudden, severe headache that is different from past headaches. Call your doctor now or seek immediate medical care if:    · You develop other symptoms. These may include hearing loss (or worse hearing loss), balance problems, dizziness, nausea, or vomiting. Watch closely for changes in your health, and be sure to contact your doctor if:    · Your tinnitus moves from both ears to one ear. · Your hearing loss gets worse within 1 day after an ear injury. · Your tinnitus or hearing loss does not get better within 1 week after an ear injury. · Your tinnitus bothers you enough that you want to take medicines to help you cope with it. If you notice changes in your tinnitus and/or your hearing, it is recommended that you have your hearing tested by your audiologist and to follow-up with your physician that manages your hearing loss (such as your ENT or Primary Care doctor). Noise-Induced Hearing Loss  What it is, and what you can do to prevent it      Exposure to loud sounds, in an occupational setting or recreational, can cause permanent hearing loss. Sound is measured in decibels (dB).   Noise-induced hearing loss is the ONLY type of preventable hearing loss. Hearing loss related to noise exposure can occur at any age. There are small sensory cells, called inner and outer hair cells, within the inner ear (cochlea). These cells process the loudness (intensity) and pitch (frequency) of sound and send the signal to the brain via our auditory nerve (vestibulocochlear nerve, cranial nerve VIII). When these cells are damaged, they can result in permanent hearing loss and/or tinnitus. The hair cells responsible for high frequency sounds, like birds chirping, are most likely to be damaged due to loud sounds. The high frequency sounds are also very important for our clarity and understanding of speech. OCCUPATIONAL NOISE EXPOSURE RECREATIONAL NOISE EXPOSURE   Some jobs may have exposure to loud sounds in the workplace. These jobs may include but are not limited to:  [employer]   Construction   Welding   Landscaping   Hairdressing/hairstyling   Musicians  [employer]    ... And more! Many activities outside of work may cause permanent hearing loss. These activities may include but are not limited to:  Lawnmowers, leaf blowers  [employer] (such as pigs squealing)   Chainsaws and other power tools  [employer] musical instruments and/or singing   Listening to music too loudly - at concerts, through stereo, through ear buds or headphones   Attending sporting events   Attending fireworks shows or using fireworks at home  [employer] of firearms   . .. And more! REDUCE OR PROTECT YOUR EARS FROM NOISE EXPOSURE    To do your best to avoid noise-induced hearing loss, here are some tips:   Limit exposure to loud sounds. 85 dB (decibels) is safe for 8 hours. As sounds are louder, the length of time the sound is safe lessens. These numbers are cumulative across a 24-hour period.   (NIOSH and CDC, 2002)  o 85 dB is safe for 8 hours  o 88 dB is safe for 4 hours  o 91 dB is safe for 2 hours  o 94 dB is safe for 1 hour  o 97 dB is safe for 30 minutes  o 100 dB is safe for 15 minutes  o 103 dB is safe for 7.5 minutes  o 106 dB is safe for 3.75 minutes  o 109 dB is safe for LESS THAN 2 minutes  o 112 dB is safe for LESS THAN 1 minute  o 115 dB is safe for ~ 30 seconds  o 130 dB can cause IMMEDIATE hearing loss   If you are unsure if a sound is too loud, consider checking the sound level with a \"sound level meter\". There are apps on smart devices that can measure the loudness of the sound. They are not as accurate as expensive equipment used by scientists, but it will give you a guesstimate of how loud the sound is, and if it may be damaging to your hearing.  If you cannot avoid loud sounds, here are ways to reduce your exposure:  o 1. Wear hearing protection  - Ear plugs and protective ear muffs can be used to reduce the intensity of the sound. The higher the NRR (noise reduction rating), the better reduction of the intensity of the sound   o 2. Turn the volume down  - When listening to music, turn the volume down, especially when wearing ear buds or headphones. A good rule of thumb is to not go beyond the middle setting on your device. If you can't hear someone talking to you from arm's length away, your music may be at a level that it can cause damage. If someone else can hear your music from 3 feet away, it may also be at a level that it can cause damage. o 3. Walk away from the sound  - If you do not have the ability to wear hearing protection or turn down the volume of the sound, you should do your best to move away from the source of the sound. - Sound decreases in intensity as we move further from the source. The sound will decrease by 6 dB for every doubling of distance from the sound source. Exposure to these sounds may cause permanent damage to your hearing.   If you suspect your hearing has changed, it is recommended that you have your hearing tested by your audiologist. Dizziness: Care Instructions  Your Care Instructions  Dizziness is the feeling of unsteadiness or fuzziness in your head. It is different than having vertigo, which is a feeling that the room is spinning or that you are moving or falling. It is also different from lightheadedness, which is the feeling that you are about to faint. It can be hard to know what causes dizziness. Some people feel dizzy when they have migraine headaches. Sometimes bouts of flu can make you feel dizzy. Some medical conditions, such as heart problems or high blood pressure, can make you feel dizzy. Many medicines can cause dizziness, including medicines for high blood pressure, pain, or anxiety. If a medicine causes your symptoms, your doctor may recommend that you stop or change the medicine. If it is a problem with your heart, you may need medicine to help your heart work better. If there is no clear reason for your symptoms, your doctor may suggest watching and waiting for a while to see if the dizziness goes away on its own. Follow-up care is a key part of your treatment and safety. Be sure to make and go to all appointments, and call your doctor if you are having problems. It's also a good idea to know your test results and keep a list of the medicines you take. How can you care for yourself at home? · If your doctor recommends or prescribes medicine, take it exactly as directed. Call your doctor if you think you are having a problem with your medicine. · Do not drive while you feel dizzy. · Try to prevent falls. Steps you can take include:  ? Using nonskid mats, adding grab bars near the tub, and using night-lights. ? Clearing your home so that walkways are free of anything you might trip on.  ? Letting family and friends know that you have been feeling dizzy. This will help them know how to help you. When should you call for help? Call 911 anytime you think you may need emergency care.  For example, call if:    · You passed out (lost consciousness). · You have dizziness along with symptoms of a heart attack. These may include:  ? Chest pain or pressure, or a strange feeling in the chest.  ? Sweating. ? Shortness of breath. ? Nausea or vomiting. ? Pain, pressure, or a strange feeling in the back, neck, jaw, or upper belly or in one or both shoulders or arms. ? Lightheadedness or sudden weakness. ? A fast or irregular heartbeat. · You have symptoms of a stroke. These may include:  ? Sudden numbness, tingling, weakness, or loss of movement in your face, arm, or leg, especially on only one side of your body. ? Sudden vision changes. ? Sudden trouble speaking. ? Sudden confusion or trouble understanding simple statements. ? Sudden problems with walking or balance. ? A sudden, severe headache that is different from past headaches. Call your doctor now or seek immediate medical care if:    · You feel dizzy and have a fever, headache, or ringing in your ears. · You have new or increased nausea and vomiting. · Your dizziness does not go away or comes back. Watch closely for changes in your health, and be sure to contact your doctor if:    · You do not get better as expected. Where can you learn more? Go to https://Socialtyze.[employer]. org and sign in to your [employer] account. Enter E283 in the Basic-Fit box to learn more about \"Dizziness: Care Instructions. \"     If you do not have an account, please click on the \"Sign Up Now\" link. Current as of: September 23, 2018  Content Version: 11.9  © 6879-2290 [employer]. Care instructions adapted under license by [facility] ([facility]). If you have questions about a medical condition or this instruction, always ask your healthcare professional. [place] [address] any warranty or liability for your use of this information.

## 2022-07-13 NOTE — PROGRESS NOTES
[facility], [address], [address], [facility] Nicolas Ruiz  P: [phone]       Patient     Joan Carrero  1995    Chief Concern     Chief Complaint   Patient presents with    New Patient     Patient states that he has hearing loss in his right ear. He states that he was in the [employer] and noticed that he had ringing in his ear. He states that he had a fall last month and struck his head and noticed that he is having more trouble hearing        Assessment and Plan      Diagnosis Orders   1. Closed fracture of temporal bone with routine healing, subsequent encounter     2. Conductive hearing loss of right ear with unrestricted hearing of left ear     3. Rhinorrhea       28year-old male status post fall, suspected syncopal episode, with otic capsule sparing right temporal bone fracture noted on CT imaging (see imaging below). Has not had significant ear fullness, no middle ear effusion noted for suspected CSF leak, but has had intermittent rhinorrhea from the right side-have asked him to collect fluid when this occurs and we will tested for beta-2 transferrin. We discussed further steps including observation, consideration of a hearing aid or middle ear exploration with possible acicular chain reconstruction-he states he would like to proceed with observation at this time. We recommended audiometric testing in 1 year, and he will call us back if he is able to collect any fluid for CSF analysis. Return if symptoms worsen or fail to improve. History of Present Illness     28 y.o. 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 Has had hearing loss, tinnitus in the left (contralateral) ear (suspected per patient due to Geovanna Bowen [address]) which has resolved, and head dysacousia of the right tongue for several days after his accident.   States hearing loss in the right ear especially in noise, which subjectively is mild, denies tinnitus, otalgia, otorrhea. States he occasionally has clear drainage from the right nostril when he is working in hot environments, but has not noted it otherwise. No diplopia/blurry vision, no difficulty with mouth opening or chewing, or facial movements. Past Medical History     Past Medical History:   Diagnosis Date    Conductive hearing loss of right ear with unrestricted hearing of left ear 7/13/2022    Gynecomastia, male        Past Surgical History     Past Surgical History:   Procedure Laterality Date    OTHER SURGICAL HISTORY  march 10, 2016    gynecomastia removal bilat breasts    OTHER SURGICAL HISTORY Bilateral 08/08/2017    simple mastectomy     Family History     History reviewed. No pertinent family history. Social History     Social History     Tobacco Use    Smoking status: Never Smoker    Smokeless tobacco: Never Used   Substance Use Topics    Alcohol use: Yes     Comment: couple times a week    Drug use: No        Allergies     No Known Allergies    Medications     Current Outpatient Medications   Medication Sig Dispense Refill    acetaminophen (TYLENOL) 500 MG tablet Take 500 mg by mouth every 6 hours as needed for Pain       No current facility-administered medications for this visit. Review of Systems     Review of Systems   Constitutional: Negative for activity change, chills, diaphoresis, fatigue and fever. HENT: Positive for hearing loss. Negative for congestion, ear discharge, ear pain and tinnitus. Eyes: Negative for visual disturbance. Respiratory: Negative for shortness of breath and wheezing. Cardiovascular: Negative for chest pain. Gastrointestinal: Negative for abdominal pain. Musculoskeletal: Negative for neck pain and neck stiffness. Skin: Negative for rash. Neurological: Negative for dizziness, light-headedness and headaches. Hematological: Negative for adenopathy.          PhysicalExam     Vitals: 07/13/22 1432   Temp: 97.3 °F (36.3 °C)   Weight: 180 lb (81.6 kg)   Height: 5' 8\" (1.727 m)       Physical Exam  Vitals reviewed. Constitutional:       Appearance: Normal appearance. HENT:      Head: Normocephalic and atraumatic. Right Ear: Tympanic membrane, ear canal and external ear normal. There is impacted cerumen. Left Ear: Tympanic membrane, ear canal and external ear normal. There is no impacted cerumen. Ears:      Murray exam findings: does not lateralize. Right Rinne: AC > BC. Left Rinne: AC > BC. Nose: No congestion or rhinorrhea. Mouth/Throat:      Lips: Pink. No lesions. Mouth: Mucous membranes are moist. No oral lesions. Tongue: No lesions. Tongue does not deviate from midline. Palate: No mass and lesions. Pharynx: Oropharynx is clear. Uvula midline. No oropharyngeal exudate or posterior oropharyngeal erythema. Eyes:      Extraocular Movements: Extraocular movements intact. Pupils: Pupils are equal, round, and reactive to light. Musculoskeletal:      Cervical back: Normal range of motion and neck supple. Lymphadenopathy:      Cervical: No cervical adenopathy. Neurological:      Mental Status: He is alert. Cranial Nerves: No cranial nerve deficit. Data Review          CT Temporal bone:      Procedure     Cerumen Debridement    Pre op: Cerumen impaction of the Right ears. Post op: Normal ear examination  Procedure : Binocular otomicroscopy with debridement of cerumen  Surgeon: HAIR Pozo  Estimated Blood Loss: None    Procedure:   Binocular otomicroscopy with debridement of cerumen impaction    After obtaining consent, the patient was placed in the examination chair in the reclined position.      -Right ear: External auditory canal with mild stenosis, consider #3 speculum, canal skin with occluding cerumen, removed with suction and wax curette.  Right tympanic membrane visible without without significant retractions or

## 2022-07-13 NOTE — PATIENT INSTRUCTIONS
How to collect Beta 2 Transferrin Specimens    Why the Beta 2 Transferrin test are done: Your doctor suspects that you may have a CSF (cerebral spinal fluid) leak. How to collect Beta 2 Transferrin sample: We will provide you with two (2) white top collection tubes. The first tube is for collecting sample and to be kept in the refrigerator. The second tube is to be kept for back up. (you will need to have 2 ml of fluid with a minimum of 1ml of fluid)  1. Nose- Patient is to hold sample tube to nostril and tilt head forward. Some samples may be harder to collect. This may require the patient to lean forward with their head between the knees. 2.   Ear- Patient should use a cotton ball. Place cotton in the ear canal.  Let the cotton ball saturate, then wash your hands before squeezing the sample in the white top tube. You can collect fluid multiple times to get enough sample. Please be sure to keep the sample refrigerated. WHEN YOU HAVE COLLECTED ENOUGH OF A SAMPLE. (YOU WILL NEED TO HAVE 2ML OF FLUID WITH A MINIMUM OF 1 ML OF FLUID)    PLEASE BRING THE SPECIMEN TO A [facility] LAB. LAB LOCATIONS IS IN WITH YOUR COLLECTION KIT.

## 2022-07-15 ENCOUNTER — HOSPITAL ENCOUNTER (OUTPATIENT)
Dept: ULTRASOUND IMAGING | Age: 27
Discharge: HOME | End: 2022-07-15
Payer: COMMERCIAL

## 2022-07-15 DIAGNOSIS — R74.01 TRANSAMINITIS: ICD-10-CM

## 2022-07-15 DIAGNOSIS — E80.6 HYPERBILIRUBINEMIA: ICD-10-CM

## 2022-07-15 PROCEDURE — 76705 ECHO EXAM OF ABDOMEN: CPT

## 2022-07-18 DIAGNOSIS — R74.01 TRANSAMINITIS: ICD-10-CM

## 2022-07-18 DIAGNOSIS — E80.6 HYPERBILIRUBINEMIA: ICD-10-CM

## 2022-07-18 LAB
A/G RATIO: 1.7 (ref 1.1–2.2)
ALBUMIN SERPL-MCNC: 4.5 G/DL (ref 3.4–5)
ALP BLD-CCNC: 60 U/L (ref 40–129)
ALT SERPL-CCNC: 56 U/L (ref 10–40)
ANION GAP SERPL CALCULATED.3IONS-SCNC: 14 MMOL/L (ref 3–16)
AST SERPL-CCNC: 35 U/L (ref 15–37)
BASOPHILS ABSOLUTE: 0 K/UL (ref 0–0.2)
BASOPHILS RELATIVE PERCENT: 1 %
BILIRUB SERPL-MCNC: 1 MG/DL (ref 0–1)
BUN BLDV-MCNC: 9 MG/DL (ref 7–20)
CALCIUM SERPL-MCNC: 9.7 MG/DL (ref 8.3–10.6)
CHLORIDE BLD-SCNC: 101 MMOL/L (ref 99–110)
CHOLESTEROL, TOTAL: 189 MG/DL (ref 0–199)
CO2: 26 MMOL/L (ref 21–32)
CREAT SERPL-MCNC: 1 MG/DL (ref 0.9–1.3)
EOSINOPHILS ABSOLUTE: 0.1 K/UL (ref 0–0.6)
EOSINOPHILS RELATIVE PERCENT: 2.1 %
GFR AFRICAN AMERICAN: >60
GFR NON-AFRICAN AMERICAN: >60
GLUCOSE BLD-MCNC: 94 MG/DL (ref 70–99)
HAV IGM SER IA-ACNC: NORMAL
HBV SURFACE AB TITR SER: 14.12 MIU/ML
HCT VFR BLD CALC: 46.1 % (ref 40.5–52.5)
HDLC SERPL-MCNC: 53 MG/DL (ref 40–60)
HEMOGLOBIN: 15.9 G/DL (ref 13.5–17.5)
HEPATITIS B SURFACE ANTIGEN INTERPRETATION: NORMAL
LDL CHOLESTEROL CALCULATED: 111 MG/DL
LYMPHOCYTES ABSOLUTE: 1.2 K/UL (ref 1–5.1)
LYMPHOCYTES RELATIVE PERCENT: 33.6 %
MCH RBC QN AUTO: 33.7 PG (ref 26–34)
MCHC RBC AUTO-ENTMCNC: 34.4 G/DL (ref 31–36)
MCV RBC AUTO: 97.9 FL (ref 80–100)
MONOCYTES ABSOLUTE: 0.4 K/UL (ref 0–1.3)
MONOCYTES RELATIVE PERCENT: 12 %
NEUTROPHILS ABSOLUTE: 1.8 K/UL (ref 1.7–7.7)
NEUTROPHILS RELATIVE PERCENT: 51.3 %
PDW BLD-RTO: 13.6 % (ref 12.4–15.4)
PLATELET # BLD: 213 K/UL (ref 135–450)
PMV BLD AUTO: 7.2 FL (ref 5–10.5)
POTASSIUM SERPL-SCNC: 5.1 MMOL/L (ref 3.5–5.1)
RBC # BLD: 4.71 M/UL (ref 4.2–5.9)
SODIUM BLD-SCNC: 141 MMOL/L (ref 136–145)
TOTAL PROTEIN: 7.2 G/DL (ref 6.4–8.2)
TRIGL SERPL-MCNC: 125 MG/DL (ref 0–150)
VLDLC SERPL CALC-MCNC: 25 MG/DL
WBC # BLD: 3.5 K/UL (ref 4–11)

## 2022-07-19 LAB
HEPATITIS C VIRUS AB BY CIA INDEX: 0.12 IV
HEPATITIS C VIRUS AB BY CIA INTERPRETATION: NEGATIVE
HIV AG/AB: NORMAL
HIV ANTIGEN: NORMAL
HIV-1 ANTIBODY: NORMAL
HIV-2 AB: NORMAL

## 2022-07-20 ENCOUNTER — OFFICE VISIT (OUTPATIENT)
Dept: INTERNAL MEDICINE CLINIC | Age: 27
End: 2022-07-20
Payer: COMMERCIAL

## 2022-07-20 VITALS
WEIGHT: 181.8 LBS | BODY MASS INDEX: 27.55 KG/M2 | HEART RATE: 92 BPM | HEIGHT: 68 IN | TEMPERATURE: 97.2 F | SYSTOLIC BLOOD PRESSURE: 131 MMHG | OXYGEN SATURATION: 100 % | DIASTOLIC BLOOD PRESSURE: 89 MMHG

## 2022-07-20 DIAGNOSIS — S02.19XD CLOSED FRACTURE OF TEMPORAL BONE WITH ROUTINE HEALING, SUBSEQUENT ENCOUNTER: ICD-10-CM

## 2022-07-20 DIAGNOSIS — I62.9 INTRACRANIAL HEMORRHAGE (HCC): Primary | ICD-10-CM

## 2022-07-20 DIAGNOSIS — H90.11 CONDUCTIVE HEARING LOSS OF RIGHT EAR WITH UNRESTRICTED HEARING OF LEFT EAR: ICD-10-CM

## 2022-07-20 PROCEDURE — 99213 OFFICE O/P EST LOW 20 MIN: CPT | Performed by: STUDENT IN AN ORGANIZED HEALTH CARE EDUCATION/TRAINING PROGRAM

## 2022-07-20 ASSESSMENT — ENCOUNTER SYMPTOMS: SHORTNESS OF BREATH: 0

## 2022-07-20 NOTE — PROGRESS NOTES
Outpatient Clinic Established Patient Note    Patient: Karena Stephenson  : 1995 (78 y.o.)  Date: 2022    CC:     HPI:    Bravo Matthews is a 29 yo M with no significant PMH who is here for f/u of lab results. Pt had traumatic SAH 1 mo ago and was hospitalized. Incidentally found to have elevated liver enzymes. Here for f/u test results. He states that he is feeling a lot better. He is almost pack to his normal self. He is still having some dizziness associated with his R ear. But this is occassional and does not impair functioning. He denies HA, blurry or double vision. Pt quit drinking alcohol      Home Meds:  Prior to Visit Medications    Medication Sig Taking? Authorizing Provider   acetaminophen (TYLENOL) 500 MG tablet Take 500 mg by mouth every 6 hours as needed for Pain  Patient not taking: Reported on 2022  Historical Provider, MD       Allergies:    Patient has no known allergies. Health Maintenance Due   Topic Date Due    COVID-19 Vaccine (1) Never done    Depression Screen  Never done    Varicella vaccine (1 of 2 - 2-dose childhood series) Never done       Immunization History   Administered Date(s) Administered    Adenovirus, Type 4 And 7, Live, Oral (Admin As 2 Tab) 2013    Anthrax (BioThrax) 2013, 2013    Hepatitis A Adult (Havrix, Vaqta) 2013    Hepatitis A/Hepatitis B (Twinrix) 2013, 2013    Hepatitis B 2013    Influenza Virus Vaccine 2013, 2013, 2013    Influenza, Live, Intranasal, Quadv, (Flumist 2-49 yrs) 10/06/2014, 2015    Meningococcal MCV4P (Menactra) 01/10/2013    Polio IPV (IPOL) 01/10/2013    Tdap (Boostrix, Adacel) 01/10/2013    Typhoid Vi capsular polysaccharide (Typhim VI) 2013    Vaccinia - Smallpox ([id number]) 2013       Review of Systems   Constitutional:  Negative for activity change. Respiratory:  Negative for shortness of breath. Cardiovascular:  Negative for chest pain. Genitourinary:  Negative for difficulty urinating. Neurological:  Positive for dizziness. Negative for headaches. Psychiatric/Behavioral:  Negative for agitation. A 10-organ Review Of Systems was obtained and otherwise unremarkable except as per HPI. Data: Old records have been reviewed electronically. PHYSICAL EXAM:  /89 (Site: Right Upper Arm, Position: Sitting, Cuff Size: Medium Adult)   Pulse 92   Temp 97.2 °F (36.2 °C) (Temporal)   Ht 5' 8\" (1.727 m)   Wt 181 lb 12.8 oz (82.5 kg)   SpO2 100%   BMI 27.64 kg/m²   Physical Exam  Constitutional:       Appearance: Normal appearance. HENT:      Head: Normocephalic and atraumatic. Eyes:      Extraocular Movements: Extraocular movements intact. Conjunctiva/sclera: Conjunctivae normal.   Cardiovascular:      Rate and Rhythm: Normal rate and regular rhythm. Pulses: Normal pulses. Pulmonary:      Effort: Pulmonary effort is normal.      Breath sounds: Normal breath sounds. Abdominal:      General: Abdomen is flat. Palpations: Abdomen is soft. Musculoskeletal:         General: No swelling or tenderness. Cervical back: Normal range of motion. Skin:     General: Skin is warm and dry. Neurological:      General: No focal deficit present. Mental Status: He is alert and oriented to person, place, and time. Psychiatric:         Mood and Affect: Mood normal.       Assessment & Plan:      1. Intracranial hemorrhage (HCC)  Pt with Traumatic SAH  - pt symptoms have improved    2. Closed fracture of temporal bone with routine healing, subsequent encounter  - saw ENT, pt opted for observation at this time    3. Conductive hearing loss of right ear with unrestricted hearing of left ear  - saw ENT, will repeat audiologic testing in 1 year    4.  Transaminitis  Hepatic function panel improved  RUQ showed cholelithasis as well as fatty liver infiltration  Hep A, B, C and HIF nonreacitve, Lipid panel with slightly elevated LDL at 111  - counseled on cont cessation of alcohol    Pt can be seen on prn basis    No follow-ups on file.     Dispo: Pt has been staffed with Dr. Nikos Wilson  _______________  Columba Desai MD, 7/20/2022 11:37 AM   PGY-2

## 2022-11-02 ENCOUNTER — APPOINTMENT (OUTPATIENT)
Dept: CT IMAGING | Age: 27
End: 2022-11-02

## 2022-11-02 ENCOUNTER — HOSPITAL ENCOUNTER (EMERGENCY)
Age: 27
Discharge: HOME | End: 2022-11-02

## 2022-11-02 VITALS
TEMPERATURE: 98 F | DIASTOLIC BLOOD PRESSURE: 95 MMHG | SYSTOLIC BLOOD PRESSURE: 160 MMHG | RESPIRATION RATE: 16 BRPM | HEART RATE: 109 BPM | BODY MASS INDEX: 25.01 KG/M2 | OXYGEN SATURATION: 97 % | HEIGHT: 68 IN | WEIGHT: 165 LBS

## 2022-11-02 DIAGNOSIS — F10.920 ACUTE ALCOHOLIC INTOXICATION WITHOUT COMPLICATION (HCC): Primary | ICD-10-CM

## 2022-11-02 DIAGNOSIS — R93.0 ABNORMAL HEAD CT: ICD-10-CM

## 2022-11-02 PROCEDURE — 99284 EMERGENCY DEPT VISIT MOD MDM: CPT

## 2022-11-02 PROCEDURE — 70450 CT HEAD/BRAIN W/O DYE: CPT

## 2022-11-02 SDOH — ECONOMIC STABILITY: FOOD INSECURITY: WITHIN THE PAST 12 MONTHS, THE FOOD YOU BOUGHT JUST DIDN'T LAST AND YOU DIDN'T HAVE MONEY TO GET MORE.: NEVER TRUE

## 2022-11-02 ASSESSMENT — LIFESTYLE VARIABLES
HOW OFTEN DO YOU HAVE A DRINK CONTAINING ALCOHOL: NEVER
HOW MANY STANDARD DRINKS CONTAINING ALCOHOL DO YOU HAVE ON A TYPICAL DAY: PATIENT DOES NOT DRINK

## 2022-11-02 ASSESSMENT — PAIN - FUNCTIONAL ASSESSMENT: PAIN_FUNCTIONAL_ASSESSMENT: NONE - DENIES PAIN

## 2022-11-03 NOTE — ED PROVIDER NOTES
[address] [place] [address]  ED  EMERGENCY DEPARTMENT ENCOUNTER      This patient was not seen and evaluated by the attending physician. Pt Name: Melisa Rasheed  MRN: [id number]  [place] 1995  Date of evaluation: 11/2/2022  Provider: JANINE Otto - CNP-C  PCP: Kya Jacobson MD      History provided by the patient. CHIEFCOMPLAINT:     Chief Complaint   Patient presents with    Alcohol Intoxication     Patient wife requested patient to come for 'alcohol problem', patient clearly intoxicated at triage; patient states something about \"cracking his skull a long time ago\" and when asked \"what does the head injury have to do with the alcohol\" patient responds with \"nothing I was just telling\"; denies HI/SI       HISTORY OF PRESENT ILLNESS:      Melisa Rasheed is a 28 y.o. male who presents to [address] [place] [address]  ED with complaints of acute alcohol intoxication and headache. Patient states that he fell back in June and had an intracranial bleed, he states that he since lost his hearing. Patient is obviously intoxicated, patient wife at the bedside states the patient has been drinking heavily essentially since the fall. Patient is alert and oriented and able to have normal conversations but does admit that he has been drinking. Patient wife is concerned because he never had repeat imaging to confirm improvement of symptoms from his fall. He has no new injuries he is resting comfortably in the bed. Is here for further evaluation      LOCATION:-  QUALITY:-  SEVERITY:-  DURATION:-  MODIFYING FACTORS:-    Nursing Notes were reviewed     REVIEW OF SYSTEMS:     Review of Systems  All systems, a total of 10, are reviewed and negative except for those that were just noted in history present illness.         PAST MEDICAL HISTORY:     Past Medical History:   Diagnosis Date    Conductive hearing loss of right ear with unrestricted hearing of left ear 7/13/2022    Gynecomastia, male          SURGICAL HISTORY:      Past Surgical History:   Procedure Laterality Date    OTHER SURGICAL HISTORY  march 10, 2016    gynecomastia removal bilat breasts    OTHER SURGICAL HISTORY Bilateral 08/08/2017    simple mastectomy         CURRENT MEDICATIONS:       Discharge Medication List as of 11/2/2022  9:10 PM        CONTINUE these medications which have NOT CHANGED    Details   acetaminophen (TYLENOL) 500 MG tablet Take 500 mg by mouth every 6 hours as needed for PainHistorical Med               ALLERGIES:    Patient has no known allergies. FAMILY HISTORY:     History reviewed. No pertinent family history. SOCIAL HISTORY:     Social History     Socioeconomic History    Marital status:      Spouse name: None    Number of children: None    Years of education: None    Highest education level: None   Tobacco Use    Smoking status: Never    Smokeless tobacco: Never   Substance and Sexual Activity    Alcohol use: Yes     Comment: couple times a week    Drug use: No    Sexual activity: Yes       SCREENINGS:    Darin Coma Scale  Eye Opening: Spontaneous  Best Verbal Response: Oriented  Best Motor Response: Obeys commands  Darin Coma Scale Score: 15        PHYSICAL EXAM:       ED Triage Vitals [11/02/22 1817]   BP Temp Temp src Heart Rate Resp SpO2 Height Weight   (!) 160/95 98 °F (36.7 °C) -- (!) 113 14 98 % 5' 8\" (1.727 m) 165 lb (74.8 kg)       Physical Exam    CONSTITUTIONAL: Awake and alert. Cooperative. Well-developed. Well-nourished. Vitals:    11/02/22 1817 11/02/22 2117   BP: (!) 160/95    Pulse: (!) 113 (!) 109   Resp: 14 16   Temp: 98 °F (36.7 °C)    SpO2: 98% 97%   Weight: 165 lb (74.8 kg)    Height: 5' 8\" (1.727 m)      HENT: Normocephalic. Atraumatic. External ears normal, without discharge. TMs clear bilaterally. Nonasal discharge. Oropharynx clear, no erythema. Mucous membranes moist.  EYES: Conjunctiva non-injected, nolid abnormalities noted. No scleral icterus.  PERRL. EOM's grossly intact. Anterior chambers clear. NECK: Supple. Normal ROM. No meningismus. No thyroid tenderness or swelling noted. CARDIOVASCULAR: RRR. No Murmer. No carotid bruits. PULMONARY/CHEST WALL: Effort normal. No tachypnea. Lungs clear to ausculation. ABDOMEN: Normal BS. Soft. Nondistended. No tenderness to palpation. No guarding. No hernias noted. No splenomegaly. Back: Spine is midline. No ecchymosis. No crepituson palpation. No obvious subluxation of vertebral column. No saddle anesthesia or evidence of cauda equina. /ANORECTAL: Not assessed  MUSKULOSKELETAL: Normal ROM. No acute deformities. No edema. No tenderness to palpate. SKIN: Warm and dry. NEUROLOGICAL:  GCS 15. CN II-XII grossly intact. Strength is 5/5 in all extremities and sensation is intact. PSYCHIATRIC: Intoxicated but alert and oriented x 3. DIAGNOSTIC RESULTS:     LABS:    No results found for this visit on 11/02/22. RADIOLOGY:  All x-ray studies are viewed/reviewed by me. Formal interpretations per the radiologist are as follows:      CT HEAD WO CONTRAST   Final Result   No acute intracranial abnormality. Unchanged linear hyperdensity in the left frontal lobe, which has the   appearance of a perforating vessel. Vascular anomaly or aneurysm is not   excluded. Recommend attention on follow-up outpatient MRI/MRA with contrast.   Previously seen subarachnoid hemorrhage in the left temporal lobe has since   resolved. EKG:  See EKG interpretation by an attending physician.       PROCEDURES:   N/A    CRITICAL CARE TIME:   N/A    CONSULTS:  None      EMERGENCY DEPARTMENT COURSE andDIFFERENTIAL DIAGNOSIS/MDM:   Vitals:    Vitals:    11/02/22 1817 11/02/22 2117   BP: (!) 160/95    Pulse: (!) 113 (!) 109   Resp: 14 16   Temp: 98 °F (36.7 °C)    SpO2: 98% 97%   Weight: 165 lb (74.8 kg)    Height: 5' 8\" (1.727 m)        Patient wasgiven the following medications:  Medications - No data to display      Patient was evaluated independently by myself with the attending physician available for consultation. Patient presented to the emergency room today with concerns about persistent intracranial injury. He fell about 6 months ago and did have intracranial bleeding at that time. Patient did go to his follow-up appointments, however he has been drinking heavily according to the patient wife. Patient is intoxicated currently. No signs of acute withdrawal.  I did do a head CT which showed resolution of the bleed however he does have an anomaly in the frontal lobe which is not new it was seen previously on imaging, I communicated this to the patient and family, patient wife states that she just wanted to make sure that his behavior was not related to the fall i.e. his drinking. I had a long discussion with the patient regards the fact that he needed to stop drinking, no indication for admission for withdrawal right now as the patient is acutely intoxicated but they were given strict return precautions. Patient and wife are in agreement with plan for discharge home. Patient had no suicidal or homicidal ideations. Patient laboratory studies, radiographic imaging, and assessment were all discussed with the patient and/orpatient family. There was shared decision-making between myself as well as the patient and/or their surrogate and we are all in agreement with discharge home. There was an opportunity for questions and all questions were answered tothe best of my ability and to the satisfaction of the patient and/or patient family. FINAL IMPRESSION:      1. Acute alcoholic intoxication without complication ([address]    2.  Abnormal head CT          DISPOSITION/PLAN:   DISPOSITION Decision To Discharge      PATIENT REFERRED TO:  Milana Sanabria - Neurology  [facility] [phone]  Call   For follow up      DISCHARGE MEDICATIONS:  Discharge Medication List as of 11/2/2022  9:10 PM                     (Please note thatportions of this note were completed with a voice recognition program.  Efforts were made to edit the dictations, but occasionally words are mis-transcribed.)    JANINE Forrest CNP-C (electronicallysigned)        JANINE Forrest CNP  11/03/22 [id number]

## 2022-11-03 NOTE — ED NOTES
Pt states he's a daily drinker. States he has gone through withdrawal before. Pt does not appear to be in withdrawal at this time. Discharge instructions discussed.       Keila Ruiz RN  11/02/22 2118

## 2023-06-20 ENCOUNTER — OFFICE VISIT (OUTPATIENT)
Dept: INTERNAL MEDICINE CLINIC | Age: 28
End: 2023-06-20

## 2023-06-20 VITALS
SYSTOLIC BLOOD PRESSURE: 136 MMHG | RESPIRATION RATE: 16 BRPM | HEART RATE: 90 BPM | OXYGEN SATURATION: 100 % | TEMPERATURE: 98.8 F | WEIGHT: 191 LBS | DIASTOLIC BLOOD PRESSURE: 91 MMHG | BODY MASS INDEX: 29.04 KG/M2

## 2023-06-20 DIAGNOSIS — R79.89 ELEVATED LFTS: ICD-10-CM

## 2023-06-20 DIAGNOSIS — R79.89 ELEVATED LFTS: Primary | ICD-10-CM

## 2023-06-20 LAB
ALBUMIN SERPL-MCNC: 4.6 G/DL (ref 3.4–5)
ALBUMIN/GLOB SERPL: 1.8 {RATIO} (ref 1.1–2.2)
ALP SERPL-CCNC: 66 U/L (ref 40–129)
ALT SERPL-CCNC: 17 U/L (ref 10–40)
ANION GAP SERPL CALCULATED.3IONS-SCNC: 10 MMOL/L (ref 3–16)
AST SERPL-CCNC: 15 U/L (ref 15–37)
BILIRUB SERPL-MCNC: 0.7 MG/DL (ref 0–1)
BUN SERPL-MCNC: 10 MG/DL (ref 7–20)
CALCIUM SERPL-MCNC: 9.1 MG/DL (ref 8.3–10.6)
CHLORIDE SERPL-SCNC: 103 MMOL/L (ref 99–110)
CO2 SERPL-SCNC: 29 MMOL/L (ref 21–32)
CREAT SERPL-MCNC: 1 MG/DL (ref 0.9–1.3)
GFR SERPLBLD CREATININE-BSD FMLA CKD-EPI: >60 ML/MIN/{1.73_M2}
GLUCOSE SERPL-MCNC: 96 MG/DL (ref 70–99)
POTASSIUM SERPL-SCNC: 3.9 MMOL/L (ref 3.5–5.1)
PROT SERPL-MCNC: 7.2 G/DL (ref 6.4–8.2)
SODIUM SERPL-SCNC: 142 MMOL/L (ref 136–145)

## 2023-06-20 PROCEDURE — 99213 OFFICE O/P EST LOW 20 MIN: CPT

## 2023-06-20 ASSESSMENT — ENCOUNTER SYMPTOMS
RESPIRATORY NEGATIVE: 1
GASTROINTESTINAL NEGATIVE: 1
ALLERGIC/IMMUNOLOGIC NEGATIVE: 1

## 2023-06-20 NOTE — PROGRESS NOTES
[facility]    Yu Joseph is a 33 y.o. male, here for evaluation of the following concerns:    Other  Patient had an elevated LFTs last year and wants to get repeat lab work done before he joins back the [employer]. He states that he has quit drinking couple of months ago and denies any acute complaints at this time. Review of Systems   Constitutional: Negative. HENT: Negative. Respiratory: Negative. Cardiovascular: Negative. Gastrointestinal: Negative. Genitourinary: Negative. Allergic/Immunologic: Negative. Hematological: Negative. Psychiatric/Behavioral: Negative. MEDICATIONS:  Prior to Visit Medications    Medication Sig Taking? Authorizing Provider   acetaminophen (TYLENOL) 500 MG tablet Take 500 mg by mouth every 6 hours as needed for Pain  Patient not taking: Reported on 7/20/2022  Historical Provider, MD        Vitals:    06/20/23 0904   BP: (!) 136/91   Site: Left Upper Arm   Position: Sitting   Cuff Size: Medium Adult   Pulse: 90   Resp: 16   Temp: 98.8 °F (37.1 °C)   TempSrc: Temporal   SpO2: 100%   Weight: 191 lb (86.6 kg)      Estimated body mass index is 29.04 kg/m² as calculated from the following:    Height as of 11/2/22: 5' 8\" (1.727 m). Weight as of this encounter: 191 lb (86.6 kg). Physical Exam  Constitutional:       Appearance: Normal appearance. HENT:      Head: Normocephalic. Right Ear: External ear normal.      Left Ear: External ear normal.      Nose: Nose normal.      Mouth/Throat:      Mouth: Mucous membranes are moist.   Eyes:      Pupils: Pupils are equal, round, and reactive to light. Cardiovascular:      Rate and Rhythm: Normal rate and regular rhythm. Pulses: Normal pulses. Heart sounds: Normal heart sounds. Pulmonary:      Effort: Pulmonary effort is normal.      Breath sounds: Normal breath sounds. Abdominal:      General: There is no distension.       Palpations: Abdomen is

## 2023-07-10 ENCOUNTER — TELEPHONE (OUTPATIENT)
Dept: INTERNAL MEDICINE CLINIC | Age: 28
End: 2023-07-10

## 2023-07-10 NOTE — TELEPHONE ENCOUNTER
----- Message from [id number] Carlos Alberto Deng sent at 7/5/2023 11:19 AM EDT -----  Regarding: History  Contact: [phone]  Hello,    I am needing a statement with my previous 5 years of prescription history provided through my DRs. Are you able to help me obtain that info? [facility] [employer]. Thank you!